# Patient Record
Sex: FEMALE | HISPANIC OR LATINO | Employment: FULL TIME | URBAN - METROPOLITAN AREA
[De-identification: names, ages, dates, MRNs, and addresses within clinical notes are randomized per-mention and may not be internally consistent; named-entity substitution may affect disease eponyms.]

---

## 2017-08-25 ENCOUNTER — OFFICE VISIT - HEALTHEAST (OUTPATIENT)
Dept: FAMILY MEDICINE | Facility: CLINIC | Age: 57
End: 2017-08-25

## 2017-08-25 DIAGNOSIS — Z00.00 HEALTHCARE MAINTENANCE: ICD-10-CM

## 2017-08-25 DIAGNOSIS — F41.9 ANXIETY: ICD-10-CM

## 2017-08-25 DIAGNOSIS — L30.9 DERMATITIS: ICD-10-CM

## 2017-08-25 LAB
CHOLEST SERPL-MCNC: 231 MG/DL
FASTING STATUS PATIENT QL REPORTED: YES
HBA1C MFR BLD: 6.3 % (ref 3.5–6)
HDLC SERPL-MCNC: 52 MG/DL
LDLC SERPL CALC-MCNC: 159 MG/DL
TRIGL SERPL-MCNC: 98 MG/DL

## 2017-08-25 ASSESSMENT — MIFFLIN-ST. JEOR: SCORE: 1014.74

## 2017-09-01 ENCOUNTER — OFFICE VISIT - HEALTHEAST (OUTPATIENT)
Dept: FAMILY MEDICINE | Facility: CLINIC | Age: 57
End: 2017-09-01

## 2017-09-01 ENCOUNTER — COMMUNICATION - HEALTHEAST (OUTPATIENT)
Dept: TELEHEALTH | Facility: CLINIC | Age: 57
End: 2017-09-01

## 2017-09-01 DIAGNOSIS — E78.5 HYPERLIPIDEMIA: ICD-10-CM

## 2017-09-01 DIAGNOSIS — R21 RASH: ICD-10-CM

## 2017-09-01 DIAGNOSIS — R73.03 PREDIABETES: ICD-10-CM

## 2017-09-01 ASSESSMENT — MIFFLIN-ST. JEOR: SCORE: 1009.07

## 2017-09-06 ENCOUNTER — AMBULATORY - HEALTHEAST (OUTPATIENT)
Dept: FAMILY MEDICINE | Facility: CLINIC | Age: 57
End: 2017-09-06

## 2017-09-06 DIAGNOSIS — E78.5 HYPERLIPIDEMIA: ICD-10-CM

## 2017-09-07 ENCOUNTER — COMMUNICATION - HEALTHEAST (OUTPATIENT)
Dept: FAMILY MEDICINE | Facility: CLINIC | Age: 57
End: 2017-09-07

## 2017-09-07 ENCOUNTER — HOSPITAL ENCOUNTER (OUTPATIENT)
Dept: MAMMOGRAPHY | Facility: HOSPITAL | Age: 57
Discharge: HOME | End: 2017-09-07
Attending: FAMILY MEDICINE

## 2017-09-07 DIAGNOSIS — Z00.00 HEALTHCARE MAINTENANCE: ICD-10-CM

## 2018-10-05 ENCOUNTER — RECORDS - HEALTHEAST (OUTPATIENT)
Dept: ADMINISTRATIVE | Facility: OTHER | Age: 58
End: 2018-10-05

## 2018-10-05 LAB — PAP SMEAR - HIM PATIENT REPORTED: NORMAL

## 2019-04-05 ENCOUNTER — OFFICE VISIT - HEALTHEAST (OUTPATIENT)
Dept: FAMILY MEDICINE | Facility: CLINIC | Age: 59
End: 2019-04-05

## 2019-04-05 DIAGNOSIS — Z00.00 ROUTINE GENERAL MEDICAL EXAMINATION AT A HEALTH CARE FACILITY: ICD-10-CM

## 2019-04-05 DIAGNOSIS — E73.9 LACTOSE INTOLERANCE: ICD-10-CM

## 2019-04-05 DIAGNOSIS — L60.9 NAIL ABNORMALITY: ICD-10-CM

## 2019-04-05 DIAGNOSIS — M54.2 NECK PAIN: ICD-10-CM

## 2019-04-05 DIAGNOSIS — Z12.11 SCREEN FOR COLON CANCER: ICD-10-CM

## 2019-04-05 DIAGNOSIS — Z12.31 VISIT FOR SCREENING MAMMOGRAM: ICD-10-CM

## 2019-04-05 ASSESSMENT — MIFFLIN-ST. JEOR: SCORE: 1053.86

## 2019-04-18 ENCOUNTER — RECORDS - HEALTHEAST (OUTPATIENT)
Dept: HEALTH INFORMATION MANAGEMENT | Facility: CLINIC | Age: 59
End: 2019-04-18

## 2020-01-20 ENCOUNTER — COMMUNICATION - HEALTHEAST (OUTPATIENT)
Dept: FAMILY MEDICINE | Facility: CLINIC | Age: 60
End: 2020-01-20

## 2020-02-05 ENCOUNTER — COMMUNICATION - HEALTHEAST (OUTPATIENT)
Dept: FAMILY MEDICINE | Facility: CLINIC | Age: 60
End: 2020-02-05

## 2020-09-02 ENCOUNTER — OFFICE VISIT - HEALTHEAST (OUTPATIENT)
Dept: FAMILY MEDICINE | Facility: CLINIC | Age: 60
End: 2020-09-02

## 2020-09-02 DIAGNOSIS — L91.8 SKIN TAG: ICD-10-CM

## 2020-09-02 DIAGNOSIS — Z12.11 SCREEN FOR COLON CANCER: ICD-10-CM

## 2020-09-02 DIAGNOSIS — Z00.00 ROUTINE GENERAL MEDICAL EXAMINATION AT A HEALTH CARE FACILITY: ICD-10-CM

## 2020-09-02 DIAGNOSIS — Z12.31 VISIT FOR SCREENING MAMMOGRAM: ICD-10-CM

## 2020-09-02 LAB
ANION GAP SERPL CALCULATED.3IONS-SCNC: 14 MMOL/L (ref 5–18)
BUN SERPL-MCNC: 20 MG/DL (ref 8–22)
CALCIUM SERPL-MCNC: 10 MG/DL (ref 8.5–10.5)
CHLORIDE BLD-SCNC: 104 MMOL/L (ref 98–107)
CHOLEST SERPL-MCNC: 218 MG/DL
CO2 SERPL-SCNC: 23 MMOL/L (ref 22–31)
CREAT SERPL-MCNC: 0.79 MG/DL (ref 0.6–1.1)
FASTING STATUS PATIENT QL REPORTED: YES
GFR SERPL CREATININE-BSD FRML MDRD: >60 ML/MIN/1.73M2
GLUCOSE BLD-MCNC: 104 MG/DL (ref 70–125)
HBA1C MFR BLD: 6.3 %
HDLC SERPL-MCNC: 52 MG/DL
HGB BLD-MCNC: 13.3 G/DL (ref 12–16)
LDLC SERPL CALC-MCNC: 144 MG/DL
POTASSIUM BLD-SCNC: 4.4 MMOL/L (ref 3.5–5)
SODIUM SERPL-SCNC: 141 MMOL/L (ref 136–145)
TRIGL SERPL-MCNC: 112 MG/DL

## 2020-09-02 ASSESSMENT — MIFFLIN-ST. JEOR: SCORE: 1062.37

## 2021-04-14 ENCOUNTER — AMBULATORY - HEALTHEAST (OUTPATIENT)
Dept: NURSING | Facility: CLINIC | Age: 61
End: 2021-04-14

## 2021-05-05 ENCOUNTER — AMBULATORY - HEALTHEAST (OUTPATIENT)
Dept: NURSING | Facility: CLINIC | Age: 61
End: 2021-05-05

## 2021-05-27 NOTE — PATIENT INSTRUCTIONS - HE
Nail and skin issues - referral to dermatology    Scheduled colonoscopy (anytime) and mammogram (due in the fall)    Probiotic - can try Align probiotic (over the counter) for one month to see if it helps with the GI issues

## 2021-05-27 NOTE — PROGRESS NOTES
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  All questions answered.  PAP UTD - will scan in results  Breast self exam technique reviewed and patient encouraged to perform self-exam monthly.  Discussed healthy lifestyle modifications.  Mammogram ordered.  Fasting lab will be scanned  The following high BMI interventions were performed this visit: encouragement to exercise and weight monitoring    1. Routine general medical examination at a health care facility  We reviewed her cholesterol level which was done at an outside facility.  This was slightly elevated at 226 but ASCVD guidelines show a 10-year risk for vascular event at 2%.  This was discussed with the patient and we have decided not to initiate any medication at this point.  We will continue to monitor yearly.    2. Screen for colon cancer  Last colonoscopy 2009.  - Ambulatory referral for Colonoscopy    3. Visit for screening mammogram  She will be due for mammogram this fall.  Orders were placed.  - Mammo Screening Bilateral; Future    4. Nail abnormality  I recommended that she follow-up with dermatology to look at her nail.  This does not look overly concerning but with a new hyperpigmentation under her nail I believe likely a biopsy would be beneficial.  - Ambulatory referral to Dermatology    5. Neck pain  Physical therapy exercises were given today.  She will let me know if she would like to see physical therapy in a more formal setting.  We discussed lifestyle modification as well.    6.  Lactose intolerance  Encouraged her to use Lactaid if needed.  If avoiding dairy products recommended making sure she is getting adequate amount of calcium.  Recommended a probiotic to see if this helps as well.      Subjective:      Bonnie Castaneda is a 58 y.o. female who presents for an annual exam.  She is overall doing well.  She has several concerns that she would like to discuss today.    1.  She brings in lab tests that were done at outside  "facility.  Her total cholesterol is 226 with an LDL of 147.  HDL cholesterol is 56.  Blood sugar is 100 fasting.  Otherwise the remainder of the BMP and a CBC as well as a urinalysis appear normal and she had a Pap smear as well which was normal but I do not see any HPV testing on this.  An EKG was done which was normal as well and normal PFTs.    2. Screen for colon cancer  She is due for a colonoscopy and would like to get that order today.    3. Visit for screening mammogram  She is due for mammogram and would like to get that order today.    4. Nail abnormality  She states that she has a skin tag under her arm which she would like looked at.  She would also like me to look at her right toenail which has a dark spot under it which seems like it is growing.    5. Neck pain  She has been experiencing some right-sided neck pain.  She feels like this is worse with stress.  She does carry a bag on that side but she does not think that it is heavy.  She would like me to evaluate this today.  She worries about \"clogged arteries.\"    6.  Lactose issues  She states that when she eats a lot of cheese or drinks a lot of milk she will have some indigestion issues.  She feels as though they are getting worse.  She has not tried any lactate medication.      Healthy Habits:   Regular Exercise: Yes  Sunscreen Use: Yes  Healthy Diet: Yes  Dental Visits Regularly: Yes  Seat Belt: Yes  Sexually active: Yes  Self Breast Exam Monthly:Yes  Colonoscopy: Yes  Prevention of Osteoporosis: No  Last Dexa: N/A    Immunization History   Administered Date(s) Administered     MMR 2003     Td,adult,historic,unspecified 2003     Tdap 2014     Immunization status: up to date and documented.    Gynecologic History  No LMP recorded. Patient is postmenopausal.  Contraception: post menopausal status  Last Pap: 2018. Results were: normal  Last mammogram: 2017. Results were: normal      OB History    Para Term  AB Living "   1 1 1         SAB TAB Ectopic Multiple Live Births                  # Outcome Date GA Lbr Steven/2nd Weight Sex Delivery Anes PTL Lv   1 Term                   No current outpatient medications on file.     No current facility-administered medications for this visit.      Past Medical History:   Diagnosis Date     Diverticulitis      Past Surgical History:   Procedure Laterality Date     OR DILATION/CURETTAGE,DIAGNOSTIC      Description: Dilation And Curettage;  Recorded: 09/02/2009;  Comments: Times 2     Patient has no known allergies.  Family History   Problem Relation Age of Onset     Heart disease Father      Heart disease Brother      Social History     Socioeconomic History     Marital status:      Spouse name: Not on file     Number of children: Not on file     Years of education: Not on file     Highest education level: Not on file   Occupational History     Not on file   Social Needs     Financial resource strain: Not on file     Food insecurity:     Worry: Not on file     Inability: Not on file     Transportation needs:     Medical: Not on file     Non-medical: Not on file   Tobacco Use     Smoking status: Never Smoker     Smokeless tobacco: Never Used   Substance and Sexual Activity     Alcohol use: No     Drug use: Not on file     Sexual activity: Yes     Birth control/protection: Post-menopausal   Lifestyle     Physical activity:     Days per week: Not on file     Minutes per session: Not on file     Stress: Not on file   Relationships     Social connections:     Talks on phone: Not on file     Gets together: Not on file     Attends Buddhist service: Not on file     Active member of club or organization: Not on file     Attends meetings of clubs or organizations: Not on file     Relationship status: Not on file     Intimate partner violence:     Fear of current or ex partner: Not on file     Emotionally abused: Not on file     Physically abused: Not on file     Forced sexual activity: Not on  "file   Other Topics Concern     Not on file   Social History Narrative     Not on file       Review of Systems  General:  Denies problems  Eyes:  Denies problems  Ears/Nose/Throat:  Denies problems  Cardiovascular:  Denies problems  Respiratory:  Denies problems  Gastrointestinal:  Denies problems  Genitourinary:  Denies problems  Musculoskeletal:  Denies problems  Skin:  Denies problems, Neurologic:  Denies problems  Psychiatric:  Denies problems  Endocrine:  Denies problems  Heme/Lymphatic:  Denies problems  Allergic/Immunologic:  Denies problems       Objective:         Vitals:    04/05/19 0827 04/05/19 0853   BP: 134/90 122/82   Pulse: 64    Resp: 12    Temp: 97.4  F (36.3  C)    TempSrc: Oral    Weight: 126 lb 6 oz (57.3 kg)    Height: 4' 11\" (1.499 m)        Physical Exam:  General Appearance: Alert, cooperative, no distress, appears stated age   Head: Normocephalic, without obvious abnormality, atraumatic  Eyes: PERRL, conjunctiva/corneas clear, EOM's intact   Ears: Normal TM's and external ear canals, both ears  Nose:Nares normal, septum midline,mucosa normal, no drainage    Throat:Lips, mucosa, and tongue normal; teeth and gums normal  Neck: Supple, symmetrical, trachea midline, no adenopathy;  thyroid: not enlarged, symmetric, no tenderness/mass/nodules  Back: Symmetric, no curvature, ROM normal,  Lungs: Clear to auscultation bilaterally, respirations unlabored  Breasts: No breast masses, tenderness, asymmetry, or nipple discharge.  Heart: Regular rate and rhythm, S1 and S2 normal, no murmur, rub, or gallop  Abdomen: Soft, non-tender, bowel sounds active all four quadrants,  no masses, no organomegaly  Extremities: Extremities normal, atraumatic, no cyanosis or edema  Skin: Skin color, texture, turgor normal, no rashes or lesions, the patient has several benign skin tags around her neck and work on her arm.  There is a mole that appears to be under her right great toenail which has some very slight " streaking.  Lymph nodes: Cervical, supraclavicular, and axillary nodes normal and   Neurologic: Normal   Musculoskeletal: Tenderness to palpation in right trapezius muscle.  Normal range of motion of head.

## 2021-05-30 ENCOUNTER — HEALTH MAINTENANCE LETTER (OUTPATIENT)
Age: 61
End: 2021-05-30

## 2021-05-31 ENCOUNTER — RECORDS - HEALTHEAST (OUTPATIENT)
Dept: ADMINISTRATIVE | Facility: CLINIC | Age: 61
End: 2021-05-31

## 2021-05-31 VITALS — HEIGHT: 58 IN | WEIGHT: 121.25 LBS | BODY MASS INDEX: 25.45 KG/M2

## 2021-05-31 VITALS — HEIGHT: 58 IN | BODY MASS INDEX: 25.19 KG/M2 | WEIGHT: 120 LBS

## 2021-06-01 ENCOUNTER — RECORDS - HEALTHEAST (OUTPATIENT)
Dept: ADMINISTRATIVE | Facility: CLINIC | Age: 61
End: 2021-06-01

## 2021-06-02 VITALS — BODY MASS INDEX: 25.48 KG/M2 | HEIGHT: 59 IN | WEIGHT: 126.38 LBS

## 2021-06-04 VITALS
WEIGHT: 128.25 LBS | BODY MASS INDEX: 25.85 KG/M2 | RESPIRATION RATE: 12 BRPM | TEMPERATURE: 96.6 F | DIASTOLIC BLOOD PRESSURE: 78 MMHG | SYSTOLIC BLOOD PRESSURE: 149 MMHG | HEART RATE: 59 BPM | HEIGHT: 59 IN

## 2021-06-11 NOTE — PROGRESS NOTES
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  All questions answered.  PAP UTD - she gets this done through her work physicals  Breast self exam technique reviewed and patient encouraged to perform self-exam monthly.  Discussed healthy lifestyle modifications.  Mammogram ordered.  Await fasting lab results  The following high BMI interventions were performed this visit: encouragement to exercise    1. Routine general medical examination at a health care facility  Labs below will be ordered.  She is very concerned regarding her family history of heart disease.  She will await the lab results but we did discuss a CT coronary calcium score.  Blood pressures were somewhat elevated today.  Lower on a manual.  - Lipid Cascade FASTING  - Glycosylated Hemoglobin A1c  - Basic Metabolic Panel  - Hemoglobin    2. Visit for screening mammogram  - Mammo Screening Bilateral; Future    3. Screen for colon cancer  She generally does a Cologuard once every 3 years.  She does believe that she is due but it gets this through her work physical.  She will let us know when she gets those results so we can update her chart.    4. Skin tag  After verbal consent was obtained 3 skin tags in her right armpit, 2 skin tags in her left armpit and 2 skin tags on her posterior neck were cleansed with an alcohol swab and numbed with 0.1 cc of lidocaine with epinephrine each.  Using a sterile iris sister these were snipped.  She tolerated the procedure well.  Minimal blood loss.          Subjective:      Bonnie Castaneda is a 60 y.o. female who presents for an annual exam.  She is overall feeling a bit stressed out.  Unfortunately her brother recently had a heart attack.  He has a history of heart disease and stents.    She is concerned about her own heart health.  She tries to eat healthfully and get some physical activity.    Cholesterol levels have been slightly elevated in the past but not to the point where we would need to do  medication.  Blood pressures have been normal in the past although slightly elevated today.    Healthy Habits:   Regular Exercise: Yes  Sunscreen Use: Yes  Healthy Diet: Yes  Dental Visits Regularly: Yes  Seat Belt: Yes  Sexually active: Yes  Self Breast Exam Monthly:Yes  Colonoscopy: No and cologuard every three years  Prevention of Osteoporosis: Yes  Last Dexa: N/A    Immunization History   Administered Date(s) Administered     INFLUENZA,RECOMBINANT,INJ,PF QUADRIVALENT 18+YRS 2020     INFLUENZA,SEASONAL QUAD, PF, =/> 6months 10/05/2017     Influenza, Seasonal, Inj PF IIV3 10/28/2009     Influenza,seasonal, Inj IIV3 10/28/2010, 10/27/2011, 2012, 10/09/2013, 10/16/2014     Influenza,seasonal,quad inj =/> 6months 10/15/2015     MMR 2003     Td,adult,historic,unspecified 2003     Tdap 2014     Immunization status: up to date and documented.    Gynecologic History  No LMP recorded. Patient is postmenopausal.  Contraception: post menopausal status  Last Pap: . Results were: normal  Last mammogram: . Results were: normal      OB History    Para Term  AB Living   1 1 1         SAB TAB Ectopic Multiple Live Births                  # Outcome Date GA Lbr Steven/2nd Weight Sex Delivery Anes PTL Lv   1 Term                No current outpatient medications on file.     No current facility-administered medications for this visit.      Past Medical History:   Diagnosis Date     Diverticulitis      Past Surgical History:   Procedure Laterality Date     DC DILATION/CURETTAGE,DIAGNOSTIC      Description: Dilation And Curettage;  Recorded: 2009;  Comments: Times 2     Patient has no known allergies.  Family History   Problem Relation Age of Onset     Heart disease Father      Heart disease Brother      Social History     Socioeconomic History     Marital status:      Spouse name: Not on file     Number of children: Not on file     Years of education: Not on file      "Highest education level: Not on file   Occupational History     Not on file   Social Needs     Financial resource strain: Not on file     Food insecurity     Worry: Not on file     Inability: Not on file     Transportation needs     Medical: Not on file     Non-medical: Not on file   Tobacco Use     Smoking status: Never Smoker     Smokeless tobacco: Never Used   Substance and Sexual Activity     Alcohol use: No     Drug use: Not on file     Sexual activity: Yes     Birth control/protection: Post-menopausal   Lifestyle     Physical activity     Days per week: Not on file     Minutes per session: Not on file     Stress: Not on file   Relationships     Social connections     Talks on phone: Not on file     Gets together: Not on file     Attends Synagogue service: Not on file     Active member of club or organization: Not on file     Attends meetings of clubs or organizations: Not on file     Relationship status: Not on file     Intimate partner violence     Fear of current or ex partner: Not on file     Emotionally abused: Not on file     Physically abused: Not on file     Forced sexual activity: Not on file   Other Topics Concern     Not on file   Social History Narrative     Not on file       Review of Systems  12 point review of systems was completed and found to be negative except for what is been stated above.      Objective:         Vitals:    09/02/20 0801 09/02/20 0825 09/02/20 0856   BP: 158/90 124/84 149/78   Pulse: (!) 59     Resp: 12     Temp: 96.6  F (35.9  C)     TempSrc: Oral     Weight: 128 lb 4 oz (58.2 kg)     Height: 4' 11\" (1.499 m)         Physical Exam:  General Appearance: Alert, cooperative, no distress, appears stated age   Head: Normocephalic, without obvious abnormality, atraumatic  Eyes: PERRL, conjunctiva/corneas clear, EOM's intact   Ears: Normal TM's and external ear canals, both ears  Nose:Nares normal, septum midline,mucosa normal, no drainage    Throat:Lips, mucosa, and tongue normal; " teeth and gums normal  Neck: Supple, symmetrical, trachea midline, no adenopathy;  thyroid: not enlarged, symmetric, no tenderness/mass/nodules  Back: Symmetric, no curvature, ROM normal,  Lungs: Clear to auscultation bilaterally, respirations unlabored  Breasts: No breast masses, tenderness, asymmetry, or nipple discharge.  Heart: Regular rate and rhythm, S1 and S2 normal, no murmur, rub, or gallop  Abdomen: Soft, non-tender, bowel sounds active all four quadrants,  no masses, no organomegaly  Extremities: Extremities normal, atraumatic, no cyanosis or edema  Skin: Skin color, texture, turgor normal, no rashes or lesions, skin tags in armpits and on neck  Lymph nodes: Cervical, supraclavicular, and axillary nodes normal and   Neurologic: Normal

## 2021-06-12 NOTE — PROGRESS NOTES
Assessment/ Plan     1. Rash  This may be a contact dermatitis as her phone has contact in that area  There is some erythema concerning for a mild cellulitis  A yeast infection is less likely    Recommend cephalexin 500 mg p.o. 3 times daily ×10 days  Recommend that she use hydrocortisone 2.5% cream as prescribed by Dr. Barclay  She understands that she will not use the topical steroid longer than 10 consecutive days  She will try to minimize contact with her phone in that area  Advised follow-up if not improving.  Would consider a referral to dermatology as the next step    2. Hyperlipidemia    Reviewed her elevated cholesterol readings and discussed cardiac risk factors  Her father has had significant cardiac disease  Her high-sensitivity CRP was elevated and reviewed that this is an inflammatory marker that is nonspecific  Recommend that she work on her diet and exercise   A handout was given describing elevated cholesterol and treatment  Given her family history and elevated cholesterol she could be considered for referral to cardiology to discuss a CT coronary calcium test  Recommend that she follow up with Dr. Barclay for other treatment recommendation      3. Prediabetes    Her hemoglobin A1c is elevated 6.3%  Recommend limiting carbohydrates in her diet  Advised follow-up with Dr. Barclay    25 minutes were spent with the patient and greater than 50% of the time was spent in face to face counseling and coordination of care        Subjective:       Bonnie Castaneda is a 57 y.o. female, patient of Dr. Barclay,  who presents to the clinic in follow-up for a rash on her left lower abdomen.  She describes a burning and somewhat itchy rash in that area.  She was concerned about shingles though this does not have the appearance of shingles.  She has not had blisters or small red bumps in this area.  Essentially, she has had an area of redness that also has a bruised appearance.  The area of redness has been increasing.   "She does admit that she wears her cell phone in her left pocket in the upper part sticks up and pushes against that area.  She has never had an obvious reaction to jewelry or other items in the past.  She denies fever or chills.  Also, she had recent laboratory testing including an elevated hemoglobin A1c of 6.3%.  Her cholesterol numbers were elevated.  Her total cholesterol was 231 with an LDL of 159.  There is a family history of coronary disease including her father and brother.    The following portions of the patient's history were reviewed and updated as appropriate: allergies, current medications, past family history, past medical history, past social history, past surgical history and problem list.    Review of Systems   A 12 point comprehensive review of systems was negative except as noted.      Current Outpatient Prescriptions   Medication Sig Dispense Refill     cephalexin (KEFLEX) 500 MG capsule Take 1 capsule (500 mg total) by mouth 3 (three) times a day for 10 days. 30 capsule 0     No current facility-administered medications for this visit.        Objective:      /64  Pulse 68  Temp 97.6  F (36.4  C) (Oral)   Resp 16  Ht 4' 10\" (1.473 m)  Wt 120 lb (54.4 kg)  BMI 25.08 kg/m2      General appearance: alert, appears stated age and cooperative  Head: Normocephalic, without obvious abnormality, atraumatic  Eyes: conjunctivae/corneas clear. PERRL, EOM's intact.   Skin: Examination of the left lower abdomen reveals an oval-shaped rash that is erythematous and warm to the touch  There is some central bluish discoloration which may be consistent with bruising  No vesicles are evident  Lymph nodes: Cervical, supraclavicular, and axillary nodes normal.  Neurologic: Alert and oriented X 3. Normal coordination and gait         No results found for this or any previous visit (from the past 168 hour(s)).       This note has been dictated using voice recognition software. Any grammatical or context " distortions are unintentional and inherent to the software
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
## 2021-06-16 PROBLEM — E78.5 HYPERLIPIDEMIA: Status: ACTIVE | Noted: 2017-09-01

## 2021-06-16 PROBLEM — R73.03 PREDIABETES: Status: ACTIVE | Noted: 2017-09-01

## 2021-06-20 NOTE — LETTER
Letter by Llay Barclay MD at      Author: Laly Barclay MD Service: -- Author Type: --    Filed:  Encounter Date: 1/20/2020 Status: Signed       Bonnie Castaneda  [address] [id number]    January 20, 2020    Dear Bonnie    In reviewing your records, we have determined a gap in your preventive services. Based on your age and health history, we recommend the follow service.     ? General Physical  ? Physical with a Pap Smear  ? Colon cancer screening  ? Mammogram  ? Immunization  ? Diabetic check  ? Blood pressure/cardiovascular check  ? Asthma check  ? Cholesterol test  ? Lab work  ? Med check    If you have had the service elsewhere, please contact us so we can update our records. Please let us know if you have transferred your care to another clinic.    Please call [phone] to schedule this appointment.    We believe that a strong preventive care program, including regular physicals and follow-up care is an important part of a healthy lifestyle and we are committed to helping you maintain your health.    Thank you for choosing us as your health care provider.    Sincerely,   [facility]  [id number] [place] [id number]  Phone Number:  [phone]

## 2021-06-20 NOTE — LETTER
Letter by Laly Barclay MD at      Author: Laly Barclay MD Service: -- Author Type: --    Filed:  Encounter Date: 2/5/2020 Status: (Other)       Bonnie Castaneda  [address] [id number]    February 5, 2020    Dear Bonnie    In reviewing your records, we have determined a gap in your preventive services. Based on your age and health history, we recommend the follow service.     ? General Physical  ? Physical with a Pap Smear  ? Colon cancer screening  ? Mammogram  ? Immunization  ? Diabetic check  ? Blood pressure/cardiovascular check  ? Asthma check  ? Cholesterol test  ? Lab work  ? Med check    If you have had the service elsewhere, please contact us so we can update our records. Please let us know if you have transferred your care to another clinic.    Please call [phone] to schedule this appointment.    We believe that a strong preventive care program, including regular physicals and follow-up care is an important part of a healthy lifestyle and we are committed to helping you maintain your health.    Thank you for choosing us as your health care provider.    Sincerely,   [facility]  [address] [id number]  Phone Number:  [phone]

## 2021-08-10 ENCOUNTER — OFFICE VISIT (OUTPATIENT)
Dept: FAMILY MEDICINE | Facility: CLINIC | Age: 61
End: 2021-08-10
Payer: COMMERCIAL

## 2021-08-10 VITALS
DIASTOLIC BLOOD PRESSURE: 67 MMHG | SYSTOLIC BLOOD PRESSURE: 133 MMHG | TEMPERATURE: 97.2 F | BODY MASS INDEX: 26.41 KG/M2 | HEIGHT: 59 IN | WEIGHT: 131 LBS | HEART RATE: 65 BPM

## 2021-08-10 DIAGNOSIS — R21 RASH AND NONSPECIFIC SKIN ERUPTION: Primary | ICD-10-CM

## 2021-08-10 DIAGNOSIS — Z12.11 SCREEN FOR COLON CANCER: ICD-10-CM

## 2021-08-10 DIAGNOSIS — Z12.31 VISIT FOR SCREENING MAMMOGRAM: ICD-10-CM

## 2021-08-10 PROCEDURE — 99213 OFFICE O/P EST LOW 20 MIN: CPT | Performed by: FAMILY MEDICINE

## 2021-08-10 RX ORDER — KETOCONAZOLE 20 MG/G
CREAM TOPICAL DAILY
Qty: 30 G | Refills: 1 | Status: CANCELLED | OUTPATIENT
Start: 2021-08-10

## 2021-08-10 RX ORDER — HYDROCORTISONE 25 MG/G
CREAM TOPICAL
Qty: 28 G | Refills: 1 | Status: SHIPPED | OUTPATIENT
Start: 2021-08-10 | End: 2021-10-25

## 2021-08-10 ASSESSMENT — MIFFLIN-ST. JEOR: SCORE: 1074.84

## 2021-08-10 NOTE — PATIENT INSTRUCTIONS
Oral zyrtec (cetirizine) pill 10 mg daily over the counter    Hydrocortisone cream sent to pharmacy    Avoid current bra and protein solution

## 2021-08-10 NOTE — PROGRESS NOTES
"    Assessment & Plan     Rash and nonspecific skin eruption  Pruritic rash on lateral aspects of bilateral breasts.  Does not appear fungal in nature, seems more consistent with a histamine reaction so recommended daily Zyrtec and topical hydrocortisone for now.  She also has a fairly new bra and has been using a protein detergent so will avoid these in case they are contributing to her rash.  - hydrocortisone, Perianal, (HYDROCORTISONE) 2.5 % cream; Apply to rash twice daily and then as needed. Do not use longer than one week.    Visit for screening mammogram  - *MA Screening Digital Bilateral; Future    Screen for colon cancer  - Adult Gastro Ref - Procedure Only; Future    Fadumo Salazar DO  [facility]    Raul Nicole is a 61 year old who presents for the following health issues  Chief Complaint   Patient presents with     Rash     around bra line, x 3 weeks, itchy     HPI     3-week history of bilateral breast rash.  It is primarily on the outer aspects of the bilateral breasts.  Some is underneath the bra line.  It is pruritic in nature and does not hurt.  Many years ago she had a painful right breast that was treated with antibiotics but this appears different.  She has a fairly new bra and has been using a protein detergent.  No one else at home has a rash.  She has no rash present anywhere else including her hands and feet.  She has not used anything for her current symptoms.        Objective    /67   Pulse 65   Temp 97.2  F (36.2  C) (Oral)   Ht 1.499 m (4' 11\")   Wt 59.4 kg (131 lb)   BMI 26.46 kg/m    Body mass index is 26.46 kg/m .  Physical Exam   GENERAL: healthy, alert and no distress  BREAST: normal without masses, tenderness or nipple discharge  SKIN: Slightly erythematous blotchy rash lateral aspects of bilateral breasts  PSYCH: mentation appears normal, affect normal/bright        "

## 2021-08-13 ENCOUNTER — MYC MEDICAL ADVICE (OUTPATIENT)
Dept: FAMILY MEDICINE | Facility: CLINIC | Age: 61
End: 2021-08-13

## 2021-08-13 DIAGNOSIS — R21 RASH AND NONSPECIFIC SKIN ERUPTION: Primary | ICD-10-CM

## 2021-08-16 RX ORDER — PREDNISONE 20 MG/1
20 TABLET ORAL EVERY MORNING
Qty: 5 TABLET | Refills: 0 | Status: SHIPPED | OUTPATIENT
Start: 2021-08-16 | End: 2021-08-21

## 2021-09-19 ENCOUNTER — HEALTH MAINTENANCE LETTER (OUTPATIENT)
Age: 61
End: 2021-09-19

## 2021-10-25 ENCOUNTER — OFFICE VISIT (OUTPATIENT)
Dept: FAMILY MEDICINE | Facility: CLINIC | Age: 61
End: 2021-10-25
Payer: COMMERCIAL

## 2021-10-25 VITALS
SYSTOLIC BLOOD PRESSURE: 136 MMHG | RESPIRATION RATE: 12 BRPM | BODY MASS INDEX: 26.41 KG/M2 | TEMPERATURE: 96.8 F | HEIGHT: 59 IN | DIASTOLIC BLOOD PRESSURE: 84 MMHG | HEART RATE: 68 BPM | WEIGHT: 131 LBS

## 2021-10-25 DIAGNOSIS — Z23 NEED FOR PROPHYLACTIC VACCINATION AND INOCULATION AGAINST INFLUENZA: ICD-10-CM

## 2021-10-25 DIAGNOSIS — Z00.00 HEALTHCARE MAINTENANCE: Primary | ICD-10-CM

## 2021-10-25 DIAGNOSIS — Z82.49 FAMILY HISTORY OF ISCHEMIC HEART DISEASE: ICD-10-CM

## 2021-10-25 DIAGNOSIS — Z12.31 VISIT FOR SCREENING MAMMOGRAM: ICD-10-CM

## 2021-10-25 LAB
ALBUMIN SERPL-MCNC: 3.6 G/DL (ref 3.4–5)
ALP SERPL-CCNC: 67 U/L (ref 40–150)
ALT SERPL W P-5'-P-CCNC: 20 U/L (ref 0–50)
ANION GAP SERPL CALCULATED.3IONS-SCNC: 4 MMOL/L (ref 3–14)
AST SERPL W P-5'-P-CCNC: 16 U/L (ref 0–45)
BILIRUB SERPL-MCNC: 0.4 MG/DL (ref 0.2–1.3)
BUN SERPL-MCNC: 14 MG/DL (ref 7–30)
CALCIUM SERPL-MCNC: 9.3 MG/DL (ref 8.5–10.1)
CHLORIDE BLD-SCNC: 106 MMOL/L (ref 94–109)
CHOLEST SERPL-MCNC: 211 MG/DL
CO2 SERPL-SCNC: 29 MMOL/L (ref 20–32)
CREAT SERPL-MCNC: 0.75 MG/DL (ref 0.52–1.04)
FASTING STATUS PATIENT QL REPORTED: YES
GFR SERPL CREATININE-BSD FRML MDRD: 88 ML/MIN/1.73M2
GLUCOSE BLD-MCNC: 96 MG/DL (ref 70–99)
HBA1C MFR BLD: 6.2 % (ref 0–5.6)
HCV AB SERPL QL IA: NONREACTIVE
HDLC SERPL-MCNC: 60 MG/DL
HGB BLD-MCNC: 13.5 G/DL (ref 11.7–15.7)
HIV 1+2 AB+HIV1 P24 AG SERPL QL IA: NONREACTIVE
LDLC SERPL CALC-MCNC: 128 MG/DL
NONHDLC SERPL-MCNC: 151 MG/DL
POTASSIUM BLD-SCNC: 4 MMOL/L (ref 3.4–5.3)
PROT SERPL-MCNC: 7.9 G/DL (ref 6.8–8.8)
SODIUM SERPL-SCNC: 139 MMOL/L (ref 133–144)
TRIGL SERPL-MCNC: 115 MG/DL

## 2021-10-25 PROCEDURE — 83036 HEMOGLOBIN GLYCOSYLATED A1C: CPT | Performed by: FAMILY MEDICINE

## 2021-10-25 PROCEDURE — 36415 COLL VENOUS BLD VENIPUNCTURE: CPT | Performed by: FAMILY MEDICINE

## 2021-10-25 PROCEDURE — 80061 LIPID PANEL: CPT | Performed by: FAMILY MEDICINE

## 2021-10-25 PROCEDURE — 90471 IMMUNIZATION ADMIN: CPT | Performed by: FAMILY MEDICINE

## 2021-10-25 PROCEDURE — 99396 PREV VISIT EST AGE 40-64: CPT | Mod: 25 | Performed by: FAMILY MEDICINE

## 2021-10-25 PROCEDURE — 90682 RIV4 VACC RECOMBINANT DNA IM: CPT | Performed by: FAMILY MEDICINE

## 2021-10-25 PROCEDURE — 87389 HIV-1 AG W/HIV-1&-2 AB AG IA: CPT | Performed by: FAMILY MEDICINE

## 2021-10-25 PROCEDURE — 86803 HEPATITIS C AB TEST: CPT | Performed by: FAMILY MEDICINE

## 2021-10-25 PROCEDURE — 85018 HEMOGLOBIN: CPT | Performed by: FAMILY MEDICINE

## 2021-10-25 PROCEDURE — 80053 COMPREHEN METABOLIC PANEL: CPT | Performed by: FAMILY MEDICINE

## 2021-10-25 ASSESSMENT — ENCOUNTER SYMPTOMS
CONSTIPATION: 0
SHORTNESS OF BREATH: 0
FEVER: 0
PARESTHESIAS: 0
HEARTBURN: 0
FREQUENCY: 0
ARTHRALGIAS: 0
DYSURIA: 0
NAUSEA: 0
DIZZINESS: 0
SORE THROAT: 0
CHILLS: 0
DIARRHEA: 0
PALPITATIONS: 0
NERVOUS/ANXIOUS: 0
MYALGIAS: 0
HEADACHES: 0
COUGH: 0
HEMATOCHEZIA: 0
JOINT SWELLING: 0
BREAST MASS: 0
HEMATURIA: 0
WEAKNESS: 0
ABDOMINAL PAIN: 0
EYE PAIN: 0

## 2021-10-25 ASSESSMENT — MIFFLIN-ST. JEOR: SCORE: 1069.45

## 2021-10-25 NOTE — PROGRESS NOTES
"Answers for HPI/ROS submitted by the patient on 10/25/2021  Frequency of exercise:: 6-7 days/week  Getting at least 3 servings of Calcium per day:: Yes  Diet:: Regular (no restrictions)  Taking medications regularly:: Yes  Medication side effects:: None  Bi-annual eye exam:: Yes  Dental care twice a year:: Yes  Sleep apnea or symptoms of sleep apnea:: None  abdominal pain: No  Blood in stool: No  Blood in urine: No  chest pain: No  chills: No  congestion: No  constipation: No  cough: No  diarrhea: No  dizziness: No  ear pain: No  eye pain: No  nervous/anxious: No  fever: No  frequency: No  genital sores: No  headaches: No  hearing loss: No  heartburn: No  arthralgias: No  joint swelling: No  peripheral edema: No  mood changes: No  myalgias: No  nausea: No  dysuria: No  palpitations: No  Skin sensation changes: No  sore throat: No  urgency: No  rash: No  shortness of breath: No  visual disturbance: No  weakness: No  pelvic pain: No  vaginal bleeding: No  vaginal discharge: No  tenderness: No  breast mass: No  breast discharge: No  Additional concerns today:: No  Duration of exercise:: 15-30 minutes        Assessment/Plan:     Health maintenance female exam.  All questions answered.  Breast self exam technique reviewed and patient encouraged to perform self-exam monthly.  Discussed healthy lifestyle modifications.  Mammogram ordered.  She states that she is up to date on colon cancer screening.  She had a Cologuard test last year.  Await fasting lab results    BMI:   Estimated body mass index is 26.77 kg/m  as calculated from the following:    Height as of this encounter: 1.49 m (4' 10.66\").    Weight as of this encounter: 59.4 kg (131 lb).   Weight management plan: Discussed healthy diet and exercise guidelines      Healthcare maintenance  - Hepatitis C Screen Reflex to HCV RNA Quant and Genotype  - HIV Antigen Antibody Combo  - Lipid panel reflex to direct LDL Fasting  - Hemoglobin A1c  - Hemoglobin  - Comprehensive " metabolic panel (BMP + Alb, Alk Phos, ALT, AST, Total. Bili, TP)    Visit for screening mammogram  - *MA Screening Digital Bilateral    Family history of ischemic heart disease  Due to family history we will have her speak with cardiology regarding coronary calcium CT scan versus a stress test.  Patient is asymptomatic.  Discussed that she may benefit from a statin medication she would like to discuss this with cardiology as well.  - Adult Cardiology Eval Referral    Need for prophylactic vaccination and inoculation against influenza  - INFLUENZA QUAD, RECOMBINANT, P-FREE (RIV4) (FLUBLOK)        Patient has been advised of split billing requirements and indicates understanding: Yes      Subjective:     Bonnie Castaneda is a 61 year old female who presents for an annual exam.  She unfortunately has been feeling fairly stressed recently.  One of her brothers is currently in the hospital with some heart issues and lung issues.  She has a strong family history of coronary artery disease in her brothers.    Her mother is 84 and living with her which has been somewhat difficult as well.    She unfortunately went to the wrong clinic today and is has added to her stress a significant amount.    Healthy Habits:   Regular Exercise: Yes  Sunscreen Use: Yes  Healthy Diet: yes  Dental Visits Regularly: yes  Seat Belt: Yes  Self Breast Exam Monthly: yes  Colonoscopy: Patient states she had a Cologuard test last year through her 's work.  She will get me those records.  Prevention of Osteoporosis: yes    Immunization History   Administered Date(s) Administered     COVID-19,PF,Pfizer 04/14/2021, 05/05/2021     FLU 6-35 months 10/28/2009     Influenza (IIV3) PF 10/28/2010, 10/27/2011, 09/27/2012, 10/09/2013, 10/16/2014     Influenza Quad, Recombinant, pf(RIV4) (Flublok) 09/02/2020, 10/25/2021     Influenza Vaccine IM > 6 months Valent IIV4 (Alfuria,Fluzone) 10/05/2017     Influenza Vaccine, 6+MO IM (QUADRIVALENT  W/PRESERVATIVES) 10/15/2015     MMR 2003     Tdap (Adacel,Boostrix) 2014     Tdap (Adult) Unspecified Formulation 2003         Gynecologic History  No LMP recorded. Patient is postmenopausal.  Contraception: post menopausal status  Last Pap: 2018 Results were: normal  Last mammogram: . Results were: normal      OB History    Para Term  AB Living   1 1 1 0 0 0   SAB TAB Ectopic Multiple Live Births   0 0 0 0 0      # Outcome Date GA Lbr Steven/2nd Weight Sex Delivery Anes PTL Lv   1 Term                No current outpatient medications on file.     Past Medical History:   Diagnosis Date     Diverticulitis      Past Surgical History:   Procedure Laterality Date     HC DILATION/CURETTAGE DIAG/THER NON OB      Description: Dilation And Curettage;  Recorded: 2009;  Comments: Times 2     Patient has no known allergies.  Family History   Problem Relation Age of Onset     Heart Disease Father      Heart Disease Brother      Social History     Socioeconomic History     Marital status:      Spouse name: Not on file     Number of children: Not on file     Years of education: Not on file     Highest education level: Not on file   Occupational History     Not on file   Tobacco Use     Smoking status: Never Smoker     Smokeless tobacco: Never Used   Substance and Sexual Activity     Alcohol use: No     Drug use: Not on file     Sexual activity: Yes     Birth control/protection: Post-menopausal   Other Topics Concern     Not on file   Social History Narrative     Not on file     Social Determinants of Health     Financial Resource Strain:      Difficulty of Paying Living Expenses:    Food Insecurity:      Worried About Running Out of Food in the Last Year:      Ran Out of Food in the Last Year:    Transportation Needs:      Lack of Transportation (Medical):      Lack of Transportation (Non-Medical):    Physical Activity:      Days of Exercise per Week:      Minutes of Exercise per  "Session:    Stress:      Feeling of Stress :    Social Connections:      Frequency of Communication with Friends and Family:      Frequency of Social Gatherings with Friends and Family:      Attends Congregational Services:      Active Member of Clubs or Organizations:      Attends Club or Organization Meetings:      Marital Status:    Intimate Partner Violence:      Fear of Current or Ex-Partner:      Emotionally Abused:      Physically Abused:      Sexually Abused:        Review of Systems  12 point review of systems was completed and found to be negative except for what is been stated above.      Objective:      Vitals:    10/25/21 1004 10/25/21 1041   BP: (!) 170/92 136/84   Pulse: 68    Resp: 12    Temp: 96.8  F (36  C)    TempSrc: Tympanic    Weight: 59.4 kg (131 lb)    Height: 1.49 m (4' 10.66\")          Physical Exam:  General Appearance: Alert, cooperative, no distress, appears stated age   Head: Normocephalic, without obvious abnormality, atraumatic  Eyes: PERRL, conjunctiva/corneas clear, EOM's intact   Ears: Normal TM's and external ear canals, both ears  Neck: Supple, symmetrical, trachea midline, no adenopathy;  thyroid: not enlarged, symmetric, no tenderness/mass/nodules  Back: Symmetric, no curvature, ROM normal,  Lungs: Clear to auscultation bilaterally, respirations unlabored  Breasts: No breast masses, tenderness, asymmetry, or nipple discharge.  Heart: Regular rate and rhythm, S1 and S2 normal, no murmur, rub, or gallop  Abdomen: Soft, non-tender, bowel sounds active all four quadrants,  no masses, no organomegaly  Extremities: Extremities normal, atraumatic, no cyanosis or edema  Skin: Skin color, texture, turgor normal, no rashes or lesions  Lymph nodes: Cervical, supraclavicular, and axillary nodes normal and   Neurologic: Normal       "

## 2022-02-17 ENCOUNTER — TELEPHONE (OUTPATIENT)
Dept: FAMILY MEDICINE | Facility: CLINIC | Age: 62
End: 2022-02-17

## 2022-02-17 ENCOUNTER — OFFICE VISIT (OUTPATIENT)
Dept: FAMILY MEDICINE | Facility: CLINIC | Age: 62
End: 2022-02-17
Payer: COMMERCIAL

## 2022-02-17 VITALS
WEIGHT: 130 LBS | TEMPERATURE: 97.6 F | HEIGHT: 59 IN | OXYGEN SATURATION: 99 % | DIASTOLIC BLOOD PRESSURE: 86 MMHG | SYSTOLIC BLOOD PRESSURE: 142 MMHG | BODY MASS INDEX: 26.21 KG/M2 | HEART RATE: 61 BPM

## 2022-02-17 DIAGNOSIS — R19.7 DIARRHEA, UNSPECIFIED TYPE: Primary | ICD-10-CM

## 2022-02-17 DIAGNOSIS — R10.13 ABDOMINAL PAIN, EPIGASTRIC: ICD-10-CM

## 2022-02-17 LAB
ALBUMIN SERPL-MCNC: 3.7 G/DL (ref 3.4–5)
ALP SERPL-CCNC: 72 U/L (ref 40–150)
ALT SERPL W P-5'-P-CCNC: 19 U/L (ref 0–50)
ANION GAP SERPL CALCULATED.3IONS-SCNC: 5 MMOL/L (ref 3–14)
AST SERPL W P-5'-P-CCNC: 10 U/L (ref 0–45)
BASOPHILS # BLD AUTO: 0 10E3/UL (ref 0–0.2)
BASOPHILS NFR BLD AUTO: 0 %
BILIRUB SERPL-MCNC: 0.3 MG/DL (ref 0.2–1.3)
BUN SERPL-MCNC: 13 MG/DL (ref 7–30)
C DIFF TOX B STL QL: POSITIVE
CALCIUM SERPL-MCNC: 9.3 MG/DL (ref 8.5–10.1)
CHLORIDE BLD-SCNC: 103 MMOL/L (ref 94–109)
CO2 SERPL-SCNC: 28 MMOL/L (ref 20–32)
CREAT SERPL-MCNC: 0.62 MG/DL (ref 0.52–1.04)
EOSINOPHIL # BLD AUTO: 0.2 10E3/UL (ref 0–0.7)
EOSINOPHIL NFR BLD AUTO: 2 %
ERYTHROCYTE [DISTWIDTH] IN BLOOD BY AUTOMATED COUNT: 13.2 % (ref 10–15)
GFR SERPL CREATININE-BSD FRML MDRD: >90 ML/MIN/1.73M2
GLUCOSE BLD-MCNC: 112 MG/DL (ref 70–99)
HCT VFR BLD AUTO: 40.7 % (ref 35–47)
HGB BLD-MCNC: 13.5 G/DL (ref 11.7–15.7)
LIPASE SERPL-CCNC: 112 U/L (ref 73–393)
LYMPHOCYTES # BLD AUTO: 1.6 10E3/UL (ref 0.8–5.3)
LYMPHOCYTES NFR BLD AUTO: 16 %
MCH RBC QN AUTO: 31.8 PG (ref 26.5–33)
MCHC RBC AUTO-ENTMCNC: 33.2 G/DL (ref 31.5–36.5)
MCV RBC AUTO: 96 FL (ref 78–100)
MONOCYTES # BLD AUTO: 0.6 10E3/UL (ref 0–1.3)
MONOCYTES NFR BLD AUTO: 6 %
NEUTROPHILS # BLD AUTO: 7.7 10E3/UL (ref 1.6–8.3)
NEUTROPHILS NFR BLD AUTO: 76 %
PLATELET # BLD AUTO: 294 10E3/UL (ref 150–450)
POTASSIUM BLD-SCNC: 3.6 MMOL/L (ref 3.4–5.3)
PROT SERPL-MCNC: 8.1 G/DL (ref 6.8–8.8)
RBC # BLD AUTO: 4.24 10E6/UL (ref 3.8–5.2)
SODIUM SERPL-SCNC: 136 MMOL/L (ref 133–144)
WBC # BLD AUTO: 10.2 10E3/UL (ref 4–11)

## 2022-02-17 PROCEDURE — 36415 COLL VENOUS BLD VENIPUNCTURE: CPT | Performed by: PHYSICIAN ASSISTANT

## 2022-02-17 PROCEDURE — 85025 COMPLETE CBC W/AUTO DIFF WBC: CPT | Performed by: PHYSICIAN ASSISTANT

## 2022-02-17 PROCEDURE — 83690 ASSAY OF LIPASE: CPT | Performed by: PHYSICIAN ASSISTANT

## 2022-02-17 PROCEDURE — 87209 SMEAR COMPLEX STAIN: CPT | Performed by: PHYSICIAN ASSISTANT

## 2022-02-17 PROCEDURE — 80053 COMPREHEN METABOLIC PANEL: CPT | Performed by: PHYSICIAN ASSISTANT

## 2022-02-17 PROCEDURE — 87493 C DIFF AMPLIFIED PROBE: CPT | Mod: 59 | Performed by: PHYSICIAN ASSISTANT

## 2022-02-17 PROCEDURE — 87506 IADNA-DNA/RNA PROBE TQ 6-11: CPT | Performed by: PHYSICIAN ASSISTANT

## 2022-02-17 PROCEDURE — 99213 OFFICE O/P EST LOW 20 MIN: CPT | Performed by: PHYSICIAN ASSISTANT

## 2022-02-17 PROCEDURE — 87177 OVA AND PARASITES SMEARS: CPT | Performed by: PHYSICIAN ASSISTANT

## 2022-02-17 ASSESSMENT — PAIN SCALES - GENERAL: PAINLEVEL: MODERATE PAIN (5)

## 2022-02-17 NOTE — PROGRESS NOTES
Assessment & Plan       ICD-10-CM    1. Diarrhea, unspecified type  R19.7 Enteric Bacteria and Virus Panel by SKIP Stool     Ova and Parasite Exam Routine     Clostridium difficile Toxin B PCR     Clostridium difficile Toxin B PCR     Ova and Parasite Exam Routine     Enteric Bacteria and Virus Panel by SKIP Stool   2. Abdominal pain, epigastric  R10.13 Enteric Bacteria and Virus Panel by SKIP Stool     Ova and Parasite Exam Routine     Clostridium difficile Toxin B PCR     Comprehensive metabolic panel (BMP + Alb, Alk Phos, ALT, AST, Total. Bili, TP)     Lipase     CBC with platelets and differential     Comprehensive metabolic panel (BMP + Alb, Alk Phos, ALT, AST, Total. Bili, TP)     Lipase     CBC with platelets and differential     Clostridium difficile Toxin B PCR     Ova and Parasite Exam Routine     Enteric Bacteria and Virus Panel by SKIP Stool     Possibly viral gastroenteritis vs other?   No red flags on exam. She is pushing on the epigastric area during the exam but when I pressed on that area during my exam there really was no increased pain or discomfort. She just notes that to the general area that is bothering her  Discussed getting some labs and stool samples today  I would expect if this was a viral GI bug that it should start to improve but will r/o stool issue and check liver and pancreas.   If work up negative and pain persists, consider imaging              Return in about 1 week (around 2/24/2022) for If not improving or worsening.    PATRICK Jack [facility] PIPO Nicole is a 61 year old who presents for the following health issues     HPI     Pain History:  When did you first notice your pain? - Less than 1 week   Have you seen anyone else for your pain? No  Where in your body do you have pain? Abdominal/Flank Pain  Onset/Duration: 6 days   Description:   Character: Sharp, Stabbing and Cramping  Location: epigastric region  Radiation: None  Intensity:  "moderate-severe  Progression of Symptoms:  same  Accompanying Signs & Symptoms:  Fever/Chills: YES- in the beginning she had chills   Gas/Bloating: YES- both   Nausea: no  Vomitting: no  Diarrhea: YES  Constipation: no  Dysuria or Hematuria: no  History:   Trauma: no  Previous similar pain: no  Previous tests done: none  Precipitating factors:   Does the pain change with:     Food: YES- cheese     Bowel Movement: YES- the pain will be relieved for a short amount of time after going     Urination: no   Other factors:  no  Therapies tried and outcome: Pepto bismol   No LMP recorded. Patient is postmenopausal.      Started about a week ago  Constant \"gnawing\" type pain across her abdomen  She has a h/o diverticulitis but feels this pain is different and points to upper abdomen versus lower abdomen  Accompanied by diarrhea - going frequently throughout the day. Sometimes there is some form, othertimes it is loose and \"all over\"   No blood in the stool  Cramping pain improves briefly after stooling but then returns  Not necessarily worsened by eating but knows if she does eat something it will be coming right back out  Keeping fluids in okay - drinking pedialyte     No fevers  Chills in the beginning -that seems to have resolved  No nausea or vomiting  Pain not increasing - just staying the same    On no medications  No antibiotic use in the last several months - was at the dentist recently but just for a cleaning  No change in diet or new foods that precipitated this  No burping, bloating or h/o reflux      Review of Systems   Remainder of ROS obtained and found to be negative other than that which was documented above        Objective    BP (!) 142/86   Pulse 61   Temp 97.6  F (36.4  C) (Tympanic)   Ht 1.488 m (4' 10.6\")   Wt 59 kg (130 lb)   SpO2 99%   BMI 26.62 kg/m    Body mass index is 26.62 kg/m .  Physical Exam   GENERAL: healthy, alert and no distress  CV: regular rates and rhythm, normal S1 S2, no S3 or " S4, no murmur, click or rub and no peripheral edema  ABDOMEN: bowel sounds normal, soft, no rebound or guarding. No specific or focal area of pain    Diagnostic Tests:   pending

## 2022-02-17 NOTE — TELEPHONE ENCOUNTER
Patient's call transferred to author    Patient reports she was evaluated today by Alicia and stool samples were ordered  Patient collected 2 out of the 3 bottles as she could not get the third bottle open    While on the phone, patient was able to open the third bottle and will collect stool after her next bowel movement     Reporting watery stools   Reports last 2 stools have had small bright red areas   Denies feeling dizzy / lightheaded - patient also had labs drawn today    Will forward to Alicia to review    Hadley Myers RN

## 2022-02-18 LAB
C COLI+JEJUNI+LARI FUSA STL QL NAA+PROBE: NOT DETECTED
EC STX1 GENE STL QL NAA+PROBE: NOT DETECTED
EC STX2 GENE STL QL NAA+PROBE: NOT DETECTED
NOROV GI+II ORF1-ORF2 JNC STL QL NAA+PR: NOT DETECTED
O+P STL MICRO: NEGATIVE
RVA NSP5 STL QL NAA+PROBE: NOT DETECTED
SALMONELLA SP RPOD STL QL NAA+PROBE: NOT DETECTED
SHIGELLA SP+EIEC IPAH STL QL NAA+PROBE: NOT DETECTED
TRI STN SPEC: NORMAL
V CHOL+PARA RFBL+TRKH+TNAA STL QL NAA+PR: NOT DETECTED
Y ENTERO RECN STL QL NAA+PROBE: NOT DETECTED

## 2022-02-19 ENCOUNTER — TELEPHONE (OUTPATIENT)
Dept: FAMILY MEDICINE | Facility: CLINIC | Age: 62
End: 2022-02-19
Payer: COMMERCIAL

## 2022-02-19 ENCOUNTER — MYC MEDICAL ADVICE (OUTPATIENT)
Dept: FAMILY MEDICINE | Facility: CLINIC | Age: 62
End: 2022-02-19
Payer: COMMERCIAL

## 2022-02-19 ENCOUNTER — NURSE TRIAGE (OUTPATIENT)
Dept: NURSING | Facility: CLINIC | Age: 62
End: 2022-02-19
Payer: COMMERCIAL

## 2022-02-19 DIAGNOSIS — R19.7 DIARRHEA: Primary | ICD-10-CM

## 2022-02-19 RX ORDER — VANCOMYCIN HYDROCHLORIDE 125 MG/1
125 CAPSULE ORAL 4 TIMES DAILY
Qty: 40 CAPSULE | Refills: 0 | Status: SHIPPED | OUTPATIENT
Start: 2022-02-19 | End: 2022-03-24

## 2022-02-19 NOTE — TELEPHONE ENCOUNTER
Patient is calling and states that she is still having diarrhea and stomach pain.  Patient is diagnosed with C Dif and no medication has been sent.  [facility] [address] in [place].  FNA paged on call Jay Jones to phone FNA back at 8:14 am.  FNA paged again at 8:34 am.  MD phone back and gave verbal for Vancomycin 125 mg PO Qid daily for 10 days quantity 40.  FNA sent prescription. FNA phoned patient back.      COVID 19 Nurse Triage Plan/Patient Instructions    Please be aware that novel coronavirus (COVID-19) may be circulating in the community. If you develop symptoms such as fever, cough, or SOB or if you have concerns about the presence of another infection including coronavirus (COVID-19), please contact your health care provider or visit [email or web address].     Disposition/Instructions    Home care recommended. Follow home care protocol based instructions.    Thank you for taking steps to prevent the spread of this virus.  o Limit your contact with others.  o Wear a simple mask to cover your cough.  o Wash your hands well and often.    Resources    [facility]: About COVID-19: [email or web address]    CDC: What to Do If You're Sick: www.cdc.gov/coronavirus/2019-ncov/about/steps-when-sick.html    CDC: Ending Home Isolation: www.cdc.gov/coronavirus/2019-ncov/hcp/disposition-in-home-patients.html     CDC: Caring for Someone: www.cdc.gov/coronavirus/2019-ncov/if-you-are-sick/care-for-someone.html     [facility]: Interim Guidance for Hospital Discharge to Home: www.health.[facility].[place]/diseases/coronavirus/hcp/hospdischarge.pdf    [facility] clinical trials (COVID-19 research studies): [email or web address].[facility]-clinical-trials     Below are the COVID-19 hotlines at the [place] Department of Health ([facility]). Interpreters are available.   o For health questions: Call [phone] or [phone] (7 a.m. to 7 p.m.)  o For questions about schools and childcare: Call  [phone] or [phone] (7 a.m. to 7 p.m.)

## 2022-02-19 NOTE — TELEPHONE ENCOUNTER
Patient is calling and states that she is still having diarrhea and stomach pain.  Patient is diagnosed with C Dif and no medication has been sent.  [facility] [address] in [place].  FNA paged on call Jay Jones to phone FNA back at 8:14 am.  FNA paged again at 8:34 am.  MD phone back and gave verbal for Vancomycin 125 mg PO Qid daily for 10 days quantity 40.  FNA sent prescription. FNA phoned patient back.    Agnes Cummings RN/FNA

## 2022-03-24 ENCOUNTER — OFFICE VISIT (OUTPATIENT)
Dept: FAMILY MEDICINE | Facility: CLINIC | Age: 62
End: 2022-03-24
Payer: COMMERCIAL

## 2022-03-24 VITALS
BODY MASS INDEX: 26.13 KG/M2 | HEART RATE: 70 BPM | WEIGHT: 127.6 LBS | DIASTOLIC BLOOD PRESSURE: 86 MMHG | SYSTOLIC BLOOD PRESSURE: 138 MMHG | OXYGEN SATURATION: 98 % | TEMPERATURE: 98.5 F

## 2022-03-24 DIAGNOSIS — R10.84 ABDOMINAL PAIN, GENERALIZED: Primary | ICD-10-CM

## 2022-03-24 LAB
ALBUMIN SERPL-MCNC: 3.9 G/DL (ref 3.4–5)
ALP SERPL-CCNC: 81 U/L (ref 40–150)
ALT SERPL W P-5'-P-CCNC: 27 U/L (ref 0–50)
ANION GAP SERPL CALCULATED.3IONS-SCNC: 7 MMOL/L (ref 3–14)
AST SERPL W P-5'-P-CCNC: 15 U/L (ref 0–45)
BASOPHILS # BLD AUTO: 0 10E3/UL (ref 0–0.2)
BASOPHILS NFR BLD AUTO: 0 %
BILIRUB SERPL-MCNC: 0.3 MG/DL (ref 0.2–1.3)
BUN SERPL-MCNC: 15 MG/DL (ref 7–30)
CALCIUM SERPL-MCNC: 9.4 MG/DL (ref 8.5–10.1)
CHLORIDE BLD-SCNC: 103 MMOL/L (ref 94–109)
CO2 SERPL-SCNC: 27 MMOL/L (ref 20–32)
CREAT SERPL-MCNC: 0.7 MG/DL (ref 0.52–1.04)
EOSINOPHIL # BLD AUTO: 0.6 10E3/UL (ref 0–0.7)
EOSINOPHIL NFR BLD AUTO: 6 %
ERYTHROCYTE [DISTWIDTH] IN BLOOD BY AUTOMATED COUNT: 13.9 % (ref 10–15)
GFR SERPL CREATININE-BSD FRML MDRD: >90 ML/MIN/1.73M2
GLUCOSE BLD-MCNC: 95 MG/DL (ref 70–99)
HCT VFR BLD AUTO: 41.5 % (ref 35–47)
HGB BLD-MCNC: 13.5 G/DL (ref 11.7–15.7)
LYMPHOCYTES # BLD AUTO: 1.8 10E3/UL (ref 0.8–5.3)
LYMPHOCYTES NFR BLD AUTO: 18 %
MCH RBC QN AUTO: 31.8 PG (ref 26.5–33)
MCHC RBC AUTO-ENTMCNC: 32.5 G/DL (ref 31.5–36.5)
MCV RBC AUTO: 98 FL (ref 78–100)
MONOCYTES # BLD AUTO: 0.9 10E3/UL (ref 0–1.3)
MONOCYTES NFR BLD AUTO: 8 %
NEUTROPHILS # BLD AUTO: 6.8 10E3/UL (ref 1.6–8.3)
NEUTROPHILS NFR BLD AUTO: 67 %
PLATELET # BLD AUTO: 266 10E3/UL (ref 150–450)
POTASSIUM BLD-SCNC: 3.9 MMOL/L (ref 3.4–5.3)
PROT SERPL-MCNC: 8.2 G/DL (ref 6.8–8.8)
RBC # BLD AUTO: 4.24 10E6/UL (ref 3.8–5.2)
SODIUM SERPL-SCNC: 137 MMOL/L (ref 133–144)
WBC # BLD AUTO: 10.2 10E3/UL (ref 4–11)

## 2022-03-24 PROCEDURE — 80053 COMPREHEN METABOLIC PANEL: CPT | Performed by: FAMILY MEDICINE

## 2022-03-24 PROCEDURE — 99213 OFFICE O/P EST LOW 20 MIN: CPT | Performed by: FAMILY MEDICINE

## 2022-03-24 PROCEDURE — 85025 COMPLETE CBC W/AUTO DIFF WBC: CPT | Performed by: FAMILY MEDICINE

## 2022-03-24 PROCEDURE — 36415 COLL VENOUS BLD VENIPUNCTURE: CPT | Performed by: FAMILY MEDICINE

## 2022-03-24 NOTE — NURSING NOTE
"Initial /86   Pulse 70   Temp 98.5  F (36.9  C) (Tympanic)   Wt 57.9 kg (127 lb 9.6 oz)   SpO2 98%   Breastfeeding No   BMI 26.13 kg/m   Estimated body mass index is 26.13 kg/m  as calculated from the following:    Height as of 2/17/22: 1.488 m (4' 10.6\").    Weight as of this encounter: 57.9 kg (127 lb 9.6 oz). .      "

## 2022-03-24 NOTE — PATIENT INSTRUCTIONS
Not sure the cause of your pain right now.  We'll start with some blood work tonight.    Please do not take any more imodium or pepto bismol for now.  You can take tylenol or ibuprofen as needed for pain.    We'll reach out tomorrow with lab results and get an update on your symptoms    If things get worse (fever, worsened pain, vomiting) please go in to the emergency room

## 2022-03-24 NOTE — PROGRESS NOTES
A/P:      ICD-10-CM    1. Abdominal pain, generalized  R10.84 Comprehensive metabolic panel (BMP + Alb, Alk Phos, ALT, AST, Total. Bili, TP)     CBC with platelets and differential     Comprehensive metabolic panel (BMP + Alb, Alk Phos, ALT, AST, Total. Bili, TP)     CBC with platelets and differential     Patient Instructions   Not sure the cause of your pain right now.  We'll start with some blood work tonight.    Please do not take any more imodium or pepto bismol for now.  You can take tylenol or ibuprofen as needed for pain.    We'll reach out tomorrow with lab results and get an update on your symptoms    If things get worse (fever, worsened pain, vomiting) please go in to the emergency room    Concerned for recurrent C diff but pt reports stool is not diarrhea so unable to test.  Begin with lab eval as above.  Stop all antidiarrhea medications.  If diarrhea develops will collect for c diff.  Pt verbalized understanding.    Raul Nicole is a 61 year old who presents for the following health issues    HPI     * abdominal pain for the last 3 days, no nausea, vomiting or diarrhea.  C/o urgent bowel movements, but no diarrhea     Pain began prior to a bowel movement.  Seems to come and go.  Not sure about aggravating factors.    Feels like when she eats she then develops abdominal pain and has to go have a bowel movement.  Ain/cramping begins after she is done eating.    Yesterday took an imodium and today took a pepto bismol.      States that she has had 6 bowel movements since she woke this morning.  Has the urge to go and then just has small very soft amounts.    No blood in the stool.  No vomiting, has been eating okay.  Abd pain improves after BM.    No fever    Diarrhea resolved after treatment with vancomycin last month.  Denies diarrhea multiple times today, reports stool is soft but not diarrhea        Review of Systems   Constitutional, HEENT, cardiovascular, pulmonary, gi and gu systems are  negative, except as otherwise noted.      Objective    /86   Pulse 70   Temp 98.5  F (36.9  C) (Tympanic)   Wt 57.9 kg (127 lb 9.6 oz)   SpO2 98%   Breastfeeding No   BMI 26.13 kg/m    Body mass index is 26.13 kg/m .  Physical Exam   PE:  VS as above   Gen:  WN/WD/WH female in NAD   Heart:  RRR without murmur, nl S1, S2, no rubs or gallops   Lungs CTA derek without rales/ronchi/wheezes   Abd: soft, postive bowel sounds, NT/ND, no HSM, no rebounding/guarding/ridigity      Epic reviewed

## 2022-03-25 ENCOUNTER — MYC MEDICAL ADVICE (OUTPATIENT)
Dept: FAMILY MEDICINE | Facility: CLINIC | Age: 62
End: 2022-03-25
Payer: COMMERCIAL

## 2022-03-25 DIAGNOSIS — R19.7 DIARRHEA OF PRESUMED INFECTIOUS ORIGIN: Primary | ICD-10-CM

## 2022-03-25 NOTE — TELEPHONE ENCOUNTER
Please call pt and let her know her blood work was normal which is great news.  Please get an update on her stools.  If she has developed diarrhea I would recommend stool tests.  If her stool remains formed I would recommend beginning a probiotic and fiber supplement (like benefiber or metamucil) once daily    If she does develop diarrhea I would recommend she notify us so a stool culture and c diff can be ordered.    Mona Sanchez, DO

## 2022-03-25 NOTE — TELEPHONE ENCOUNTER
Call placed to patient  Relayed Dr. Sanchez's message    Patient states after she left the clinic yesterday she had 2 diarrhea episodes  No diarrhea today - states stools are formed and soft    Will forward to Dr. Sanchez to determine if plan changes with 2 diarrhea episodes yesterday     Hadley Myers RN

## 2022-03-25 NOTE — TELEPHONE ENCOUNTER
Call placed to patient  Relayed Dr. Sanchez's message    Patient verbalized understanding  No further questions/concerns    Stool collection kit placed at the  for     Hadley Myers RN

## 2022-03-25 NOTE — TELEPHONE ENCOUNTER
I placed stool orders for her now that she can  here at clinic.  I would recommend she  the containers so she can do a collection with her next diarrhea stool.    Please emphasize to her that they cannot test formed stool so she can only collect and return stool that is in fact diarrhea.    I would still recommend the probiotic.  She can hold off on the fiber.

## 2022-03-27 ENCOUNTER — APPOINTMENT (OUTPATIENT)
Dept: CT IMAGING | Facility: HOSPITAL | Age: 62
End: 2022-03-27
Attending: EMERGENCY MEDICINE
Payer: COMMERCIAL

## 2022-03-27 ENCOUNTER — HOSPITAL ENCOUNTER (EMERGENCY)
Facility: HOSPITAL | Age: 62
Discharge: HOME | End: 2022-03-27
Attending: EMERGENCY MEDICINE | Admitting: EMERGENCY MEDICINE
Payer: COMMERCIAL

## 2022-03-27 VITALS
SYSTOLIC BLOOD PRESSURE: 107 MMHG | WEIGHT: 126 LBS | DIASTOLIC BLOOD PRESSURE: 51 MMHG | HEIGHT: 59 IN | BODY MASS INDEX: 25.4 KG/M2 | TEMPERATURE: 100.1 F | OXYGEN SATURATION: 97 % | RESPIRATION RATE: 20 BRPM | HEART RATE: 84 BPM

## 2022-03-27 DIAGNOSIS — K52.9 COLITIS: ICD-10-CM

## 2022-03-27 LAB
ALBUMIN SERPL-MCNC: 3.4 G/DL (ref 3.5–5)
ALP SERPL-CCNC: 66 U/L (ref 45–120)
ALT SERPL W P-5'-P-CCNC: 19 U/L (ref 0–45)
ANION GAP SERPL CALCULATED.3IONS-SCNC: 10 MMOL/L (ref 5–18)
AST SERPL W P-5'-P-CCNC: 15 U/L (ref 0–40)
BASOPHILS # BLD AUTO: 0 10E3/UL (ref 0–0.2)
BASOPHILS NFR BLD AUTO: 0 %
BILIRUB SERPL-MCNC: 0.5 MG/DL (ref 0–1)
BUN SERPL-MCNC: 9 MG/DL (ref 8–22)
C COLI+JEJUNI+LARI FUSA STL QL NAA+PROBE: NOT DETECTED
C DIFF TOX B STL QL: POSITIVE
CALCIUM SERPL-MCNC: 8.6 MG/DL (ref 8.5–10.5)
CHLORIDE BLD-SCNC: 101 MMOL/L (ref 98–107)
CO2 SERPL-SCNC: 26 MMOL/L (ref 22–31)
CREAT SERPL-MCNC: 0.77 MG/DL (ref 0.6–1.1)
EC STX1 GENE STL QL NAA+PROBE: NOT DETECTED
EC STX2 GENE STL QL NAA+PROBE: NOT DETECTED
EOSINOPHIL # BLD AUTO: 0.2 10E3/UL (ref 0–0.7)
EOSINOPHIL NFR BLD AUTO: 2 %
ERYTHROCYTE [DISTWIDTH] IN BLOOD BY AUTOMATED COUNT: 13.4 % (ref 10–15)
FLUAV RNA SPEC QL NAA+PROBE: NEGATIVE
FLUBV RNA RESP QL NAA+PROBE: NEGATIVE
GFR SERPL CREATININE-BSD FRML MDRD: 88 ML/MIN/1.73M2
GLUCOSE BLD-MCNC: 131 MG/DL (ref 70–125)
HCT VFR BLD AUTO: 41.6 % (ref 35–47)
HGB BLD-MCNC: 13.8 G/DL (ref 11.7–15.7)
IMM GRANULOCYTES # BLD: 0 10E3/UL
IMM GRANULOCYTES NFR BLD: 0 %
LACTATE SERPL-SCNC: 1.1 MMOL/L (ref 0.7–2)
LIPASE SERPL-CCNC: <9 U/L (ref 0–52)
LYMPHOCYTES # BLD AUTO: 1.1 10E3/UL (ref 0.8–5.3)
LYMPHOCYTES NFR BLD AUTO: 10 %
MAGNESIUM SERPL-MCNC: 1.9 MG/DL (ref 1.8–2.6)
MCH RBC QN AUTO: 31.9 PG (ref 26.5–33)
MCHC RBC AUTO-ENTMCNC: 33.2 G/DL (ref 31.5–36.5)
MCV RBC AUTO: 96 FL (ref 78–100)
MONOCYTES # BLD AUTO: 1 10E3/UL (ref 0–1.3)
MONOCYTES NFR BLD AUTO: 10 %
NEUTROPHILS # BLD AUTO: 8.5 10E3/UL (ref 1.6–8.3)
NEUTROPHILS NFR BLD AUTO: 78 %
NOROV GI+II ORF1-ORF2 JNC STL QL NAA+PR: NOT DETECTED
NRBC # BLD AUTO: 0 10E3/UL
NRBC BLD AUTO-RTO: 0 /100
PLATELET # BLD AUTO: 268 10E3/UL (ref 150–450)
POTASSIUM BLD-SCNC: 3.8 MMOL/L (ref 3.5–5)
PROT SERPL-MCNC: 7.2 G/DL (ref 6–8)
RBC # BLD AUTO: 4.33 10E6/UL (ref 3.8–5.2)
RVA NSP5 STL QL NAA+PROBE: NOT DETECTED
SALMONELLA SP RPOD STL QL NAA+PROBE: NOT DETECTED
SARS-COV-2 RNA RESP QL NAA+PROBE: NEGATIVE
SHIGELLA SP+EIEC IPAH STL QL NAA+PROBE: NOT DETECTED
SODIUM SERPL-SCNC: 137 MMOL/L (ref 136–145)
TROPONIN I SERPL-MCNC: <0.01 NG/ML (ref 0–0.29)
V CHOL+PARA RFBL+TRKH+TNAA STL QL NAA+PR: NOT DETECTED
WBC # BLD AUTO: 10.9 10E3/UL (ref 4–11)
Y ENTERO RECN STL QL NAA+PROBE: NOT DETECTED

## 2022-03-27 PROCEDURE — 99285 EMERGENCY DEPT VISIT HI MDM: CPT | Mod: 25

## 2022-03-27 PROCEDURE — 96361 HYDRATE IV INFUSION ADD-ON: CPT

## 2022-03-27 PROCEDURE — 250N000013 HC RX MED GY IP 250 OP 250 PS 637: Performed by: EMERGENCY MEDICINE

## 2022-03-27 PROCEDURE — 87493 C DIFF AMPLIFIED PROBE: CPT | Performed by: EMERGENCY MEDICINE

## 2022-03-27 PROCEDURE — 82040 ASSAY OF SERUM ALBUMIN: CPT | Performed by: EMERGENCY MEDICINE

## 2022-03-27 PROCEDURE — 80053 COMPREHEN METABOLIC PANEL: CPT | Performed by: EMERGENCY MEDICINE

## 2022-03-27 PROCEDURE — 258N000003 HC RX IP 258 OP 636: Performed by: EMERGENCY MEDICINE

## 2022-03-27 PROCEDURE — 87040 BLOOD CULTURE FOR BACTERIA: CPT | Mod: XU | Performed by: EMERGENCY MEDICINE

## 2022-03-27 PROCEDURE — 250N000011 HC RX IP 250 OP 636: Performed by: EMERGENCY MEDICINE

## 2022-03-27 PROCEDURE — 83735 ASSAY OF MAGNESIUM: CPT | Performed by: EMERGENCY MEDICINE

## 2022-03-27 PROCEDURE — 87636 SARSCOV2 & INF A&B AMP PRB: CPT | Performed by: EMERGENCY MEDICINE

## 2022-03-27 PROCEDURE — 74177 CT ABD & PELVIS W/CONTRAST: CPT

## 2022-03-27 PROCEDURE — 87506 IADNA-DNA/RNA PROBE TQ 6-11: CPT | Performed by: EMERGENCY MEDICINE

## 2022-03-27 PROCEDURE — C9803 HOPD COVID-19 SPEC COLLECT: HCPCS

## 2022-03-27 PROCEDURE — 85004 AUTOMATED DIFF WBC COUNT: CPT | Performed by: EMERGENCY MEDICINE

## 2022-03-27 PROCEDURE — 36415 COLL VENOUS BLD VENIPUNCTURE: CPT | Performed by: EMERGENCY MEDICINE

## 2022-03-27 PROCEDURE — 84484 ASSAY OF TROPONIN QUANT: CPT | Performed by: EMERGENCY MEDICINE

## 2022-03-27 PROCEDURE — 83690 ASSAY OF LIPASE: CPT | Performed by: EMERGENCY MEDICINE

## 2022-03-27 PROCEDURE — 83605 ASSAY OF LACTIC ACID: CPT | Performed by: EMERGENCY MEDICINE

## 2022-03-27 PROCEDURE — 96374 THER/PROPH/DIAG INJ IV PUSH: CPT | Mod: 59

## 2022-03-27 RX ORDER — IOPAMIDOL 755 MG/ML
100 INJECTION, SOLUTION INTRAVASCULAR ONCE
Status: COMPLETED | OUTPATIENT
Start: 2022-03-27 | End: 2022-03-27

## 2022-03-27 RX ORDER — ACETAMINOPHEN 325 MG/1
325-650 TABLET ORAL EVERY 6 HOURS PRN
COMMUNITY
End: 2023-03-21

## 2022-03-27 RX ORDER — VANCOMYCIN HYDROCHLORIDE 125 MG/1
125 CAPSULE ORAL 4 TIMES DAILY
Qty: 40 CAPSULE | Refills: 0 | Status: SHIPPED | OUTPATIENT
Start: 2022-03-27 | End: 2022-04-06

## 2022-03-27 RX ORDER — KETOROLAC TROMETHAMINE 30 MG/ML
15 INJECTION, SOLUTION INTRAMUSCULAR; INTRAVENOUS ONCE
Status: COMPLETED | OUTPATIENT
Start: 2022-03-27 | End: 2022-03-27

## 2022-03-27 RX ORDER — LOPERAMIDE HYDROCHLORIDE 2 MG/1
2 TABLET ORAL 4 TIMES DAILY PRN
COMMUNITY
End: 2023-03-21

## 2022-03-27 RX ORDER — ACETAMINOPHEN 325 MG/1
650 TABLET ORAL ONCE
Status: COMPLETED | OUTPATIENT
Start: 2022-03-27 | End: 2022-03-27

## 2022-03-27 RX ADMIN — IOPAMIDOL 100 ML: 755 INJECTION, SOLUTION INTRAVENOUS at 07:39

## 2022-03-27 RX ADMIN — KETOROLAC TROMETHAMINE 15 MG: 30 INJECTION, SOLUTION INTRAMUSCULAR at 09:58

## 2022-03-27 RX ADMIN — SODIUM CHLORIDE 1000 ML: 9 INJECTION, SOLUTION INTRAVENOUS at 06:16

## 2022-03-27 RX ADMIN — SODIUM CHLORIDE 1000 ML: 9 INJECTION, SOLUTION INTRAVENOUS at 08:40

## 2022-03-27 RX ADMIN — ACETAMINOPHEN 650 MG: 325 TABLET ORAL at 06:22

## 2022-03-27 RX ADMIN — FIDAXOMICIN 200 MG: 200 TABLET, FILM COATED ORAL at 08:49

## 2022-03-27 ASSESSMENT — ENCOUNTER SYMPTOMS
VOMITING: 0
DIFFICULTY URINATING: 0
COUGH: 0
ABDOMINAL PAIN: 1
HEADACHES: 0
CONFUSION: 0
FEVER: 1
RHINORRHEA: 0
BACK PAIN: 0
DIARRHEA: 1

## 2022-03-27 NOTE — PHARMACY-ADMISSION MEDICATION HISTORY
Pharmacy Note - Admission Medication History    Pertinent Provider Information: patient took vancomycin oral 125mg for a 10 day course 2/19/22 through 3/1/22. She states she completed the full course     ______________________________________________________________________    Prior To Admission (PTA) med list completed and updated in EMR.       PTA Med List   Medication Sig Last Dose     acetaminophen (TYLENOL) 325 MG tablet Take 325-650 mg by mouth every 6 hours as needed for mild pain 3/26/2022 at pm     loperamide (IMODIUM A-D) 2 MG tablet Take 2 mg by mouth 4 times daily as needed for diarrhea 3/26/2022 at pm       Information source(s): Patient and CareEverywhere/[employer]  Method of interview communication: phone    Summary of Changes to PTA Med List  New: acetaminophen, loperamide  Discontinued: vancomycin  Changed: none    Patient was asked about OTC/herbal products specifically.  PTA med list reflects this.    In the past week, patient estimated taking medication this percent of the time:  greater than 90%.    Allergies were reviewed, assessed, and updated with the patient.      Patient does not use any multi-dose medications prior to admission.    The information provided in this note is only as accurate as the sources available at the time of the update(s).    Thank you for the opportunity to participate in the care of this patient.    Augusta Whalen  3/27/2022 7:48 AM

## 2022-03-27 NOTE — ED TRIAGE NOTES
"Diarrhea and stomach cramps for 5 days. States she was seen for this on Thursday. She states no meds were prescribed. She states she took \"a little Pepto\". She can eat and drink, \"but it runs right through me\". Cramps are a 10. She denies bloody stool.  "

## 2022-03-27 NOTE — ED NOTES
Pt's spouse wondering if pt can have something to eat now to see if she has a bm, informed him would check with provider.

## 2022-03-27 NOTE — ED PROVIDER NOTES
eMERGENCY dEPARTMENT PROGRESS NOTE         ED COURSE AND MEDICAL DECISION MAKING  Patient was signed out to me by Bernard Pfeiffer MD at 7:11 AM    8:08 AM I rechecked the patient.   9:22 AM I rechecked and updated the patient.  10:11 AM I rechecked and updated the patient.     Bonnie Castaneda is a 61 year old female who presented to the ED for evaluation of diarrhea.     ED Course as of 03/27/22 1108   Sun Mar 27, 2022   0653 62 yo F who presents with watery diarrhea, fever. 102.8 F here on arrival. CT abd/pelvis pending. Labs so far reassuring. May need admission due to initial presentation with tachycardia, fever. Pt had hx of c. Diff last month, treated with vancomycin   0733 I met the pt, introduced myself, she is heading to CT now   0805 CT Abdomen Pelvis w Contrast  1.  Patient has a pancolitis with changes greatest involving the ascending colon and proximal transverse colon. There are a few reactive nodes in the ileocolic mesentery without any other complications.  2.  Prior sigmoid resection with staple line.  3.  Incidental right renal cysts.   0820 I talked to the patient again, she was unaware that she had C. difficile colitis last month although the watery diarrhea she had been improved with vancomycin.  Her symptoms are much worse over the past few days.  Her heart rate has normalized, fever has come down.  First liter of normal saline is complete and I ordered a second.  I will give her first dose of Fidaxomicin here. I offered admission vs home management of sx if she is feeling improved after initial rehydration. She and her  prefer home, but we will reassess after 2nd liter   0847 Magnesium: 1.9   0929 PO trial here, 2nd liter NS finishing   1048 Patient has had almost a full meal here, had some cramping which was improved with Toradol.  Both her and her  are motivated to go home, I discussed return precautions closely.  They were sent home with written prescriptions for fidaxomicin as  well as vancomycin in case the pharmacy does not have any in stock or their insurance plan does not cover.  We discussed symptomatic control at home and follow-up.          At the conclusion of the encounter I discussed the results of all of the tests and the disposition. The questions were answered. The patient or family acknowledged understanding and was agreeable with the care plan.     LAB  Pertinent labs results reviewed   Results for orders placed or performed during the hospital encounter of 03/27/22   CT Abdomen Pelvis w Contrast    Impression    IMPRESSION:   1.  Patient has a pancolitis with changes greatest involving the ascending colon and proximal transverse colon. There are a few reactive nodes in the ileocolic mesentery without any other complications.  2.  Prior sigmoid resection with staple line.  3.  Incidental right renal cysts.   Comprehensive metabolic panel   Result Value Ref Range    Sodium 137 136 - 145 mmol/L    Potassium 3.8 3.5 - 5.0 mmol/L    Chloride 101 98 - 107 mmol/L    Carbon Dioxide (CO2) 26 22 - 31 mmol/L    Anion Gap 10 5 - 18 mmol/L    Urea Nitrogen 9 8 - 22 mg/dL    Creatinine 0.77 0.60 - 1.10 mg/dL    Calcium 8.6 8.5 - 10.5 mg/dL    Glucose 131 (H) 70 - 125 mg/dL    Alkaline Phosphatase 66 45 - 120 U/L    AST 15 0 - 40 U/L    ALT 19 0 - 45 U/L    Protein Total 7.2 6.0 - 8.0 g/dL    Albumin 3.4 (L) 3.5 - 5.0 g/dL    Bilirubin Total 0.5 0.0 - 1.0 mg/dL    GFR Estimate 88 >60 mL/min/1.73m2   Lactic acid whole blood   Result Value Ref Range    Lactic Acid 1.1 0.7 - 2.0 mmol/L   Troponin I (now)   Result Value Ref Range    Troponin I <0.01 0.00 - 0.29 ng/mL   Symptomatic; Unknown Influenza A/B & SARS-CoV2 (COVID-19) Virus PCR Multiplex Nasopharyngeal    Specimen: Nasopharyngeal; Swab   Result Value Ref Range    Influenza A PCR Negative Negative    Influenza B PCR Negative Negative    SARS CoV2 PCR Negative Negative   CBC with platelets and differential   Result Value Ref Range     WBC Count 10.9 4.0 - 11.0 10e3/uL    RBC Count 4.33 3.80 - 5.20 10e6/uL    Hemoglobin 13.8 11.7 - 15.7 g/dL    Hematocrit 41.6 35.0 - 47.0 %    MCV 96 78 - 100 fL    MCH 31.9 26.5 - 33.0 pg    MCHC 33.2 31.5 - 36.5 g/dL    RDW 13.4 10.0 - 15.0 %    Platelet Count 268 150 - 450 10e3/uL    % Neutrophils 78 %    % Lymphocytes 10 %    % Monocytes 10 %    % Eosinophils 2 %    % Basophils 0 %    % Immature Granulocytes 0 %    NRBCs per 100 WBC 0 <1 /100    Absolute Neutrophils 8.5 (H) 1.6 - 8.3 10e3/uL    Absolute Lymphocytes 1.1 0.8 - 5.3 10e3/uL    Absolute Monocytes 1.0 0.0 - 1.3 10e3/uL    Absolute Eosinophils 0.2 0.0 - 0.7 10e3/uL    Absolute Basophils 0.0 0.0 - 0.2 10e3/uL    Absolute Immature Granulocytes 0.0 <=0.4 10e3/uL    Absolute NRBCs 0.0 10e3/uL   Result Value Ref Range    Lipase <9 0 - 52 U/L   Result Value Ref Range    Magnesium 1.9 1.8 - 2.6 mg/dL         RADIOLOGY    Pertinent imaging reviewed   Please see official radiology report.  CT Abdomen Pelvis w Contrast   Final Result   IMPRESSION:    1.  Patient has a pancolitis with changes greatest involving the ascending colon and proximal transverse colon. There are a few reactive nodes in the ileocolic mesentery without any other complications.   2.  Prior sigmoid resection with staple line.   3.  Incidental right renal cysts.          FINAL IMPRESSION    1. Colitis         DISCHARGE PRESCRIPTIONS  New Prescriptions    FIDAXOMICIN (DIFICID) 200 MG TABLET    Take 1 tablet (200 mg) by mouth 2 times daily for 10 days    VANCOMYCIN (VANCOCIN) 125 MG CAPSULE    Take 1 capsule (125 mg) by mouth 4 times daily for 10 days     Chirag Bowling M.D.  Emergency Medicine  [facility] EMERGENCY DEPARTMENT  [address] [address]  [phone]  Dept: [phone]     Chirag Bowling MD  03/27/22 1111

## 2022-03-27 NOTE — ED PROVIDER NOTES
EMERGENCY DEPARTMENT ENCOUNTER      NAME: Bonnie Castaneda  AGE: 61 year old female  YOB: 1960  MRN: [id number]  EVALUATION DATE & TIME: 3/27/2022  5:48 AM    PCP: Laly Barclay    ED PROVIDER: Meir Pfeiffer MD        Chief Complaint   Patient presents with     Diarrhea     cramps and diarrha for 5 days.         FINAL IMPRESSION:  No diagnosis found.      ED COURSE & MEDICAL DECISION MAKING:    Pertinent Labs & Imaging studies reviewed. (See chart for details)  61 year old female presents to the Emergency Department for evaluation of fever, abdominal cramps, diarrhea    Plan for labs, stool testing, CT imaging    Signed out to Dr. Bowling pending labs and imaging.     5:59 AM I met with the patient for the initial interview and physical examination. Discussed plan for treatment and workup in the ED.  PPE: Provider wore gloves, N95 mask, eye protection, and paper mask.      At the conclusion of the encounter I discussed the results of all of the tests and the disposition. The questions were answered. The patient or family acknowledged understanding and was agreeable with the care plan.     ED Course as of 03/27/22 0743   Sun Mar 27, 2022   0653 62 yo F who presents with watery diarrhea, fever. 102.8 F here on arrival. CT abd/pelvis pending. Labs so far reassuring. May need admission due to initial presentation with tachycardia, fever. Pt had hx of c. Diff last month, treated with vancomycin   0733 I met the pt, introduced myself, she is heading to CT now               MEDICATIONS GIVEN IN THE EMERGENCY:  Medications   0.9% sodium chloride BOLUS (1,000 mLs Intravenous New Bag 3/27/22 0616)   acetaminophen (TYLENOL) tablet 650 mg (650 mg Oral Given 3/27/22 0622)   iopamidol (ISOVUE-370) solution 100 mL (100 mLs Intravenous Given 3/27/22 0739)       NEW PRESCRIPTIONS STARTED AT TODAY'S ER VISIT  New Prescriptions    No medications on file       "    =================================================================    HPI    Triage note  \"Diarrhea and stomach cramps for 5 days. States she was seen for this on Thursday. She states no meds were prescribed. She states she took \"a little Pepto\". She can eat and drink, \"but it runs right through me\". Cramps are a 10. She denies bloody stool.  \"      Patient information was obtained from: The patient    Use of : N/A       Bonnie Castaneda is a 61 year old female with a pertinent history of diverticulitis who presents to this ED via walk-in for evaluation of diarrhea.    The patient reports that she has had 5 days of fever and watery stools with an associated mostly centralized abdominal cramping. She notes a history of diverticulitis which she had surgery for ~14 years ago. Her current symptoms feel similar to her prior pain associated with diverticulitis. The patient was at the clinic the other day and was advised to take Pepto-Bismol. She was recently in the hospital with here mom who has lung cancer but her mother did not have any GI issues. The patient denies any recent travel or recent use of antibiotics. Denies difficulty urinating, frequency, vomiting, cough, rhinorrhea, rash, and any other symptoms or complaints at this time.     Per Chart Review,  3/24/2022 The patient presented to [facility] for evaluation of abdominal pain. Blood work was done. Advised to discontinue imodium or Pepto bismol and take Tylenol and Ibuprofen as needed for pain.    REVIEW OF SYSTEMS   Review of Systems   Constitutional: Positive for fever.   HENT: Negative for rhinorrhea.    Respiratory: Negative for cough.    Gastrointestinal: Positive for abdominal pain (cramping) and diarrhea. Negative for vomiting.   Genitourinary: Negative for difficulty urinating.   Musculoskeletal: Negative for back pain.   Skin: Negative for rash.   Allergic/Immunologic: Negative for immunocompromised state.   Neurological: " "Negative for headaches.   Psychiatric/Behavioral: Negative for confusion.       PAST MEDICAL HISTORY:  Past Medical History:   Diagnosis Date     Diverticulitis        PAST SURGICAL HISTORY:  Past Surgical History:   Procedure Laterality Date     HC DILATION/CURETTAGE DIAG/THER NON OB      Description: Dilation And Curettage;  Recorded: 09/02/2009;  Comments: Times 2           CURRENT MEDICATIONS:    No current outpatient medications on file.      ALLERGIES:  No Known Allergies    FAMILY HISTORY:  Family History   Problem Relation Age of Onset     Heart Disease Father      Heart Disease Brother        SOCIAL HISTORY:   Social History     Socioeconomic History     Marital status:      Spouse name: Not on file     Number of children: Not on file     Years of education: Not on file     Highest education level: Not on file   Occupational History     Not on file   Tobacco Use     Smoking status: Never Smoker     Smokeless tobacco: Never Used   Substance and Sexual Activity     Alcohol use: No     Drug use: Not on file     Sexual activity: Yes     Birth control/protection: Post-menopausal   Other Topics Concern     Not on file   Social History Narrative     Not on file     Social Determinants of Health     Financial Resource Strain: Not on file   Food Insecurity: Not on file   Transportation Needs: Not on file   Physical Activity: Not on file   Stress: Not on file   Social Connections: Not on file   Intimate Partner Violence: Not on file   Housing Stability: Not on file       VITALS:  /61   Pulse 87   Temp 100.1  F (37.8  C) (Oral)   Resp 20   Ht 1.499 m (4' 11\")   Wt 57.2 kg (126 lb)   SpO2 97%   BMI 25.45 kg/m      PHYSICAL EXAM      Vitals: /61   Pulse 87   Temp 100.1  F (37.8  C) (Oral)   Resp 20   Ht 1.499 m (4' 11\")   Wt 57.2 kg (126 lb)   SpO2 97%   BMI 25.45 kg/m    General: Appears in no acute distress, awake, alert, interactive.  Eyes: Conjunctivae non-injected. Sclera " anicteric.  HENT: Atraumatic.  Neck: Supple.  Respiratory/Chest: Respiration unlabored. 2+ radial pulses.  Abdomen: non distended. Diffuse mild abdominal tenderness. No rigidity.   Musculoskeletal: Normal extremities. No edema or erythema.  Skin: Normal color. No rash or diaphoresis.  Neurologic: Face symmetric, moves all extremities spontaneously. Speech clear.  Psychiatric: Oriented to person, place, and time. Affect appropriate.       LAB:  All pertinent labs reviewed and interpreted.  Results for orders placed or performed during the hospital encounter of 03/27/22   Comprehensive metabolic panel   Result Value Ref Range    Sodium 137 136 - 145 mmol/L    Potassium 3.8 3.5 - 5.0 mmol/L    Chloride 101 98 - 107 mmol/L    Carbon Dioxide (CO2) 26 22 - 31 mmol/L    Anion Gap 10 5 - 18 mmol/L    Urea Nitrogen 9 8 - 22 mg/dL    Creatinine 0.77 0.60 - 1.10 mg/dL    Calcium 8.6 8.5 - 10.5 mg/dL    Glucose 131 (H) 70 - 125 mg/dL    Alkaline Phosphatase 66 45 - 120 U/L    AST 15 0 - 40 U/L    ALT 19 0 - 45 U/L    Protein Total 7.2 6.0 - 8.0 g/dL    Albumin 3.4 (L) 3.5 - 5.0 g/dL    Bilirubin Total 0.5 0.0 - 1.0 mg/dL    GFR Estimate 88 >60 mL/min/1.73m2   Lactic acid whole blood   Result Value Ref Range    Lactic Acid 1.1 0.7 - 2.0 mmol/L   Troponin I (now)   Result Value Ref Range    Troponin I <0.01 0.00 - 0.29 ng/mL   Symptomatic; Unknown Influenza A/B & SARS-CoV2 (COVID-19) Virus PCR Multiplex Nasopharyngeal    Specimen: Nasopharyngeal; Swab   Result Value Ref Range    Influenza A PCR Negative Negative    Influenza B PCR Negative Negative    SARS CoV2 PCR Negative Negative   CBC with platelets and differential   Result Value Ref Range    WBC Count 10.9 4.0 - 11.0 10e3/uL    RBC Count 4.33 3.80 - 5.20 10e6/uL    Hemoglobin 13.8 11.7 - 15.7 g/dL    Hematocrit 41.6 35.0 - 47.0 %    MCV 96 78 - 100 fL    MCH 31.9 26.5 - 33.0 pg    MCHC 33.2 31.5 - 36.5 g/dL    RDW 13.4 10.0 - 15.0 %    Platelet Count 268 150 - 450 10e3/uL     % Neutrophils 78 %    % Lymphocytes 10 %    % Monocytes 10 %    % Eosinophils 2 %    % Basophils 0 %    % Immature Granulocytes 0 %    NRBCs per 100 WBC 0 <1 /100    Absolute Neutrophils 8.5 (H) 1.6 - 8.3 10e3/uL    Absolute Lymphocytes 1.1 0.8 - 5.3 10e3/uL    Absolute Monocytes 1.0 0.0 - 1.3 10e3/uL    Absolute Eosinophils 0.2 0.0 - 0.7 10e3/uL    Absolute Basophils 0.0 0.0 - 0.2 10e3/uL    Absolute Immature Granulocytes 0.0 <=0.4 10e3/uL    Absolute NRBCs 0.0 10e3/uL   Result Value Ref Range    Lipase <9 0 - 52 U/L       RADIOLOGY:  Reviewed all pertinent imaging. Please see official radiology report.  CT Abdomen Pelvis w Contrast    (Results Pending)           I, Dennis Medina, am serving as a scribe to document services personally performed by Bernard Pfeiffer MD based on my observation and the provider's statements to me. I, Dr. Bernard Pfeiffer, attest that Dennis Medina is acting in a scribe capacity, has observed my performance of the services and has documented them in accordance with my direction.    Bernard Pfeiffer MD  Emergency Medicine  [facility] EMERGENCY DEPARTMENT  [address] [id number] [phone]     Bernard Pfeiffer MD  03/27/22 0751

## 2022-03-27 NOTE — ED NOTES
Pt c/o abdominal cramping and feels like she has to go to bathrooom for bm, pt taken to bathroom, pt voided with small flecks of stool mixed urine.  Back to room, provider in to see pt.

## 2022-03-28 ENCOUNTER — MYC MEDICAL ADVICE (OUTPATIENT)
Dept: FAMILY MEDICINE | Facility: CLINIC | Age: 62
End: 2022-03-28
Payer: COMMERCIAL

## 2022-03-29 ENCOUNTER — VIRTUAL VISIT (OUTPATIENT)
Dept: FAMILY MEDICINE | Facility: CLINIC | Age: 62
End: 2022-03-29
Payer: COMMERCIAL

## 2022-03-29 DIAGNOSIS — A04.72 C. DIFFICILE COLITIS: Primary | ICD-10-CM

## 2022-03-29 PROCEDURE — 99214 OFFICE O/P EST MOD 30 MIN: CPT | Mod: TEL | Performed by: FAMILY MEDICINE

## 2022-03-29 NOTE — PROGRESS NOTES
Bonnie is a 61 year old who is being evaluated via a billable video visit.      How would you like to obtain your AVS? MyChart  If the video visit is dropped, the invitation should be resent by: Text to cell phone: [phone]  Will anyone else be joining your video visit? 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 Took pepto but was not much help        -------------------------    Assessment/Plan:    Bonnie Castaneda is a 61 year old female presenting for:    C. difficile colitis  It is interesting because in the hospital it looks like she stated that the vancomycin was helpful however she tells me that initially it was not.  Dificid sent to the pharmacy.  Referral for gastroenterology sent as well in case she does not improve.  She is not having any further fevers or bloody stools.  It may be reasonable to do limited Imodium.  Discussed using a barrier protector perirectally.  - fidaxomicin (DIFICID) 200 MG tablet  Dispense: 20 tablet; Refill: 0  - Adult Gastro Ref - Consult Only        Medications Discontinued During This Encounter   Medication Reason     fidaxomicin (DIFICID) 200 MG tablet  "Reorder           Chief Complaint:  Infection          Subjective:   Bonnie Castaneda is a pleasant 61 year old female being evaluated via video visit today for the following concern/s:     Diarrhea: Patient has been experiencing diarrhea for the last month and a half.  She was seen here in clinic and was told that she was diagnosed with \"C. difficile\" but did not feel so she left any samples.  She thought this was a diagnosis that was made based on her clinical presentation.  I can clearly see that we have results of stool samples on 217 but patient is adamant that she did not need these.  Either way, stool samples came positive for the C. difficile and she was started on vancomycin.  She took this medication and states that it did seem to help a bit.  She unfortunately then got a bit worse and on 27 March was seen in the emergency department with a fever and tachycardia.  She had a CT scan that showed pancolitis.  Stool sample came back positive for C. difficile once again.  She was started on a longer dose of vancomycin.  She states that she has not taken the antibiotic as she wants to know what she should do.    She continues to have 8-10 stools daily.  No blood in the stool.  Eating and drinking well.  Mild nausea.  Irritation perirectally.    She states Imodium does help she was told not to take it.      12 point review of systems completed and negative except for what has been described above    History   Smoking Status     Never Smoker   Smokeless Tobacco     Never Used         Current Outpatient Medications:      acetaminophen (TYLENOL) 325 MG tablet, Take 325-650 mg by mouth every 6 hours as needed for mild pain, Disp: , Rfl:      fidaxomicin (DIFICID) 200 MG tablet, Take 1 tablet (200 mg) by mouth 2 times daily, Disp: 20 tablet, Rfl: 0     vancomycin (VANCOCIN) 125 MG capsule, Take 1 capsule (125 mg) by mouth 4 times daily for 10 days, Disp: 40 capsule, Rfl: 0     loperamide (IMODIUM A-D) 2 MG tablet, Take " 2 mg by mouth 4 times daily as needed for diarrhea (Patient not taking: Reported on 3/29/2022), Disp: , Rfl:         Objective:  No vitals were done due to the nature of this visit  No flowsheet data found.            General: No acute distress  Psych: Appropriate affect  HEENT: moist mucous membranes  Pulmonary: Breathing comfortably, speaking in complete sentences  Extremities: warm and well perfused with no edema  Skin: warm and dry with no rash       This note has been dictated and transcribed using voice recognition software.   Any errors in transcription are unintentional and inherent to the software.      Video-Visit Details    Type of service:  Video Visit    Video End Time:304    I personally spent over 30 minutes performing care related to this patient on the day of the patient visit.  This includes chart review, precharting, talking with/ examining the patient as well as completing after visit documentation.      Originating Location (pt. Location): Home    Distant Location (provider location):  [facility]     Platform used for Video Visit: [employer]

## 2022-04-01 LAB
BACTERIA BLD CULT: NO GROWTH
BACTERIA BLD CULT: NO GROWTH

## 2022-11-20 ENCOUNTER — HEALTH MAINTENANCE LETTER (OUTPATIENT)
Age: 62
End: 2022-11-20

## 2022-12-24 ENCOUNTER — HEALTH MAINTENANCE LETTER (OUTPATIENT)
Age: 62
End: 2022-12-24

## 2023-02-03 ENCOUNTER — IMMUNIZATION (OUTPATIENT)
Dept: FAMILY MEDICINE | Facility: CLINIC | Age: 63
End: 2023-02-03
Payer: COMMERCIAL

## 2023-02-03 PROCEDURE — 0124A COVID-19 VACCINE BIVALENT BOOSTER 12+ (PFIZER): CPT

## 2023-02-03 PROCEDURE — 91312 COVID-19 VACCINE BIVALENT BOOSTER 12+ (PFIZER): CPT

## 2023-03-20 PROBLEM — R73.03 PREDIABETES: Status: RESOLVED | Noted: 2017-09-01 | Resolved: 2023-03-20

## 2023-03-20 PROBLEM — E78.5 HYPERLIPIDEMIA: Status: RESOLVED | Noted: 2017-09-01 | Resolved: 2023-03-20

## 2023-03-21 ENCOUNTER — LAB (OUTPATIENT)
Dept: FAMILY MEDICINE | Facility: CLINIC | Age: 63
End: 2023-03-21

## 2023-03-21 ENCOUNTER — OFFICE VISIT (OUTPATIENT)
Dept: FAMILY MEDICINE | Facility: CLINIC | Age: 63
End: 2023-03-21
Payer: COMMERCIAL

## 2023-03-21 VITALS
DIASTOLIC BLOOD PRESSURE: 82 MMHG | BODY MASS INDEX: 27.09 KG/M2 | WEIGHT: 134.38 LBS | RESPIRATION RATE: 12 BRPM | SYSTOLIC BLOOD PRESSURE: 130 MMHG | TEMPERATURE: 97.8 F | HEIGHT: 59 IN | HEART RATE: 56 BPM | OXYGEN SATURATION: 100 %

## 2023-03-21 DIAGNOSIS — Z12.11 SCREEN FOR COLON CANCER: ICD-10-CM

## 2023-03-21 DIAGNOSIS — Z00.00 HEALTHCARE MAINTENANCE: Primary | ICD-10-CM

## 2023-03-21 DIAGNOSIS — Z82.49 FAMILY HISTORY OF ISCHEMIC HEART DISEASE: ICD-10-CM

## 2023-03-21 DIAGNOSIS — Z12.4 CERVICAL CANCER SCREENING: ICD-10-CM

## 2023-03-21 DIAGNOSIS — L65.9 HAIR LOSS: ICD-10-CM

## 2023-03-21 DIAGNOSIS — Z12.31 VISIT FOR SCREENING MAMMOGRAM: ICD-10-CM

## 2023-03-21 LAB
ANION GAP SERPL CALCULATED.3IONS-SCNC: 12 MMOL/L (ref 7–15)
BUN SERPL-MCNC: 16.9 MG/DL (ref 8–23)
CALCIUM SERPL-MCNC: 9.7 MG/DL (ref 8.8–10.2)
CHLORIDE SERPL-SCNC: 102 MMOL/L (ref 98–107)
CHOLEST SERPL-MCNC: 237 MG/DL
CREAT SERPL-MCNC: 0.82 MG/DL (ref 0.51–0.95)
DEPRECATED HCO3 PLAS-SCNC: 26 MMOL/L (ref 22–29)
ERYTHROCYTE [DISTWIDTH] IN BLOOD BY AUTOMATED COUNT: 13.6 % (ref 10–15)
FERRITIN SERPL-MCNC: 251 NG/ML (ref 11–328)
GFR SERPL CREATININE-BSD FRML MDRD: 81 ML/MIN/1.73M2
GLUCOSE SERPL-MCNC: 103 MG/DL (ref 70–99)
HCT VFR BLD AUTO: 41.3 % (ref 35–47)
HDLC SERPL-MCNC: 59 MG/DL
HGB BLD-MCNC: 13.5 G/DL (ref 11.7–15.7)
LDLC SERPL CALC-MCNC: 151 MG/DL
MCH RBC QN AUTO: 31.7 PG (ref 26.5–33)
MCHC RBC AUTO-ENTMCNC: 32.7 G/DL (ref 31.5–36.5)
MCV RBC AUTO: 97 FL (ref 78–100)
NONHDLC SERPL-MCNC: 178 MG/DL
PLATELET # BLD AUTO: 280 10E3/UL (ref 150–450)
POTASSIUM SERPL-SCNC: 4.2 MMOL/L (ref 3.4–5.3)
RBC # BLD AUTO: 4.26 10E6/UL (ref 3.8–5.2)
SODIUM SERPL-SCNC: 140 MMOL/L (ref 136–145)
T4 FREE SERPL-MCNC: 0.81 NG/DL (ref 0.9–1.7)
TRIGL SERPL-MCNC: 137 MG/DL
TSH SERPL DL<=0.005 MIU/L-ACNC: 5.56 UIU/ML (ref 0.3–4.2)
WBC # BLD AUTO: 7.3 10E3/UL (ref 4–11)

## 2023-03-21 PROCEDURE — 82306 VITAMIN D 25 HYDROXY: CPT | Performed by: FAMILY MEDICINE

## 2023-03-21 PROCEDURE — 87624 HPV HI-RISK TYP POOLED RSLT: CPT | Performed by: FAMILY MEDICINE

## 2023-03-21 PROCEDURE — 80061 LIPID PANEL: CPT | Performed by: FAMILY MEDICINE

## 2023-03-21 PROCEDURE — G0145 SCR C/V CYTO,THINLAYER,RESCR: HCPCS | Performed by: FAMILY MEDICINE

## 2023-03-21 PROCEDURE — 80048 BASIC METABOLIC PNL TOTAL CA: CPT | Performed by: FAMILY MEDICINE

## 2023-03-21 PROCEDURE — 99396 PREV VISIT EST AGE 40-64: CPT | Performed by: FAMILY MEDICINE

## 2023-03-21 PROCEDURE — 84443 ASSAY THYROID STIM HORMONE: CPT | Performed by: FAMILY MEDICINE

## 2023-03-21 PROCEDURE — 85027 COMPLETE CBC AUTOMATED: CPT | Performed by: FAMILY MEDICINE

## 2023-03-21 PROCEDURE — 82728 ASSAY OF FERRITIN: CPT | Performed by: FAMILY MEDICINE

## 2023-03-21 PROCEDURE — 99213 OFFICE O/P EST LOW 20 MIN: CPT | Mod: 25 | Performed by: FAMILY MEDICINE

## 2023-03-21 PROCEDURE — 84439 ASSAY OF FREE THYROXINE: CPT | Performed by: FAMILY MEDICINE

## 2023-03-21 PROCEDURE — 36415 COLL VENOUS BLD VENIPUNCTURE: CPT | Performed by: FAMILY MEDICINE

## 2023-03-21 ASSESSMENT — ENCOUNTER SYMPTOMS
FEVER: 0
COUGH: 0
CONSTIPATION: 0
NERVOUS/ANXIOUS: 0
NAUSEA: 0
PALPITATIONS: 0
HEMATURIA: 0
FREQUENCY: 0
ABDOMINAL PAIN: 0
HEARTBURN: 0
BREAST MASS: 0
SORE THROAT: 0
DIZZINESS: 0
PARESTHESIAS: 0
EYE PAIN: 0
MYALGIAS: 0
SHORTNESS OF BREATH: 0
ARTHRALGIAS: 0
DIARRHEA: 0
CHILLS: 0
HEMATOCHEZIA: 0
WEAKNESS: 0
JOINT SWELLING: 0
HEADACHES: 0
DYSURIA: 0

## 2023-03-21 NOTE — PROGRESS NOTES
"Answers for HPI/ROS submitted by the patient on 3/21/2023  Frequency of exercise:: 6-7 days/week  Getting at least 3 servings of Calcium per day:: Yes  Diet:: Regular (no restrictions)  Taking medications regularly:: Not Applicable  Medication side effects:: None  Bi-annual eye exam:: Yes  Dental care twice a year:: Yes  Sleep apnea or symptoms of sleep apnea:: None  abdominal pain: No  Blood in stool: No  Blood in urine: No  chest pain: No  chills: No  congestion: No  constipation: No  cough: No  diarrhea: No  dizziness: No  ear pain: No  eye pain: No  nervous/anxious: No  fever: No  frequency: No  genital sores: No  headaches: No  hearing loss: No  heartburn: No  arthralgias: No  joint swelling: No  peripheral edema: No  mood changes: No  myalgias: No  nausea: No  dysuria: No  palpitations: No  Skin sensation changes: No  sore throat: No  urgency: No  rash: No  shortness of breath: No  visual disturbance: No  weakness: No  pelvic pain: No  vaginal bleeding: No  vaginal discharge: No  tenderness: No  breast mass: No  breast discharge: No  Additional concerns today:: Yes  Duration of exercise:: 15-30 minutes        Assessment/Plan:     Health maintenance female exam.  All questions answered.  Await pap smear results.  Breast self exam technique reviewed and patient encouraged to perform self-exam monthly.  Discussed healthy lifestyle modifications.  Mammogram ordered.  Await fasting lab results    BMI:   Estimated body mass index is 27.14 kg/m  as calculated from the following:    Height as of this encounter: 1.499 m (4' 11\").    Weight as of this encounter: 61 kg (134 lb 6 oz).   Weight management plan: Discussed healthy diet and exercise guidelines      Healthcare maintenance  - REVIEW OF HEALTH MAINTENANCE PROTOCOL ORDERS  - Lipid panel reflex to direct LDL Fasting  - TSH with free T4 reflex  - BASIC METABOLIC PANEL  - CBC with Platelets  - Vitamin D Deficiency  - Ferritin  - Lipid panel reflex to direct LDL " Fasting  - TSH with free T4 reflex  - BASIC METABOLIC PANEL  - CBC with Platelets  - Vitamin D Deficiency  - Ferritin    Cervical cancer screening  - Pap screen with HPV - recommended age 30 - 65 years    Screen for colon cancer  Patient prefers to do Cologuard over colonoscopy and this was sent to the pharmacy.  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  Laboratory testing done as below.  Discussed biotin supplement.  - TSH with free T4 reflex  - Ferritin  - TSH with free T4 reflex  - Ferritin    Family history of ischemic heart disease  Due to family history of ischemic heart disease we discussed either talking with cardiology or doing a CT coronary calcium scan.  We discussed that this will only look at the vessels of her heart but I think would be a good place to start.  I placed the order today for the CT coronary calcium scan.  Discussed that when she gets that done I will alert her with the results and we can discuss further steps in management if needed.  - CT Coronary Calcium Scan        Patient has been advised of split billing requirements and indicates understanding: Yes      Subjective:     Bonnie Castaneda is a 62 year old female who presents for an annual exam.  She unfortunately has been having some difficulties this past year.  Her mother passed away This past July and she is still grieving from this.  She does state that she is a good support group both with the [employer] as well as family and friends.    Her uncle passed away a few weeks ago as well which has been somewhat difficulty.    She notes that a few months after her mother passed away she began to experience hair loss.  This is slowed a bit but she worries about it.  She does not have any patches of hair loss and notes that this is mostly here on her scalp that has been lost.    She again would  "like to discuss cardiac testing.  At her visit last year she mentioned that there is a strong family history of coronary artery disease.  At that time we had made plans for her to see a cardiologist to discuss any further testing.  She is asymptomatic.  She would like to do a \"full body\" vessel scan to see if there is any plaque buildup.  She is very worried about this.  Cholesterol last year was slightly elevated to 211 with an ASCVD vascular event risk of 3%.        Healthy Habits:   Regular Exercise: Yes  Sunscreen Use: Yes  Healthy Diet: yes  Dental Visits Regularly: yes  Seat Belt: Yes  Self Breast Exam Monthly: yes  Colonoscopy: pt states she has been doing testing through 's work but they are no longer doing the testing - unsure if FIT test or cologuard  Prevention of Osteoporosis: yes      Immunization History   Administered Date(s) Administered     COVID-19 Vaccine 12+ (Pfizer) 2021, 2021, 2021     COVID-19 Vaccine Bivalent Booster 12+ (Pfizer) 2023     FLU 6-35 months 10/28/2009     Influenza (IIV3) PF 10/28/2010, 10/27/2011, 2012, 10/09/2013, 10/16/2014     Influenza Vaccine 50-64 or 18-64 w/egg allergy (Flublok) 2020, 10/25/2021     Influenza Vaccine >6 months (Alfuria,Fluzone) 10/05/2017     Influenza Vaccine, 6+MO IM (QUADRIVALENT W/PRESERVATIVES) 10/15/2015     MMR 2003     Tdap (Adacel,Boostrix) 2014     Tdap (Adult) Unspecified Formulation 2003         Gynecologic History  No LMP recorded. Patient is postmenopausal.  Contraception: post menopausal status  Last Pap: . Results were: normal  Last mammogram: . Results were: normal      OB History    Para Term  AB Living   1 1 1 0 0 0   SAB IAB Ectopic Multiple Live Births   0 0 0 0 0      # Outcome Date GA Lbr Steven/2nd Weight Sex Delivery Anes PTL Lv   1 Term                No current outpatient medications on file.     Past Medical History:   Diagnosis Date     " "Diverticulitis      Past Surgical History:   Procedure Laterality Date     HC DILATION/CURETTAGE DIAG/THER NON OB      Description: Dilation And Curettage;  Recorded: 09/02/2009;  Comments: Times 2     Patient has no known allergies.  Family History   Problem Relation Age of Onset     Heart Disease Father      Heart Disease Brother      Social History     Socioeconomic History     Marital status:      Spouse name: Not on file     Number of children: Not on file     Years of education: Not on file     Highest education level: Not on file   Occupational History     Not on file   Tobacco Use     Smoking status: Never     Smokeless tobacco: Never   Substance and Sexual Activity     Alcohol use: No     Drug use: Not on file     Sexual activity: Yes     Birth control/protection: Post-menopausal   Other Topics Concern     Not on file   Social History Narrative     Not on file     Social Determinants of Health     Financial Resource Strain: Not on file   Food Insecurity: Not on file   Transportation Needs: Not on file   Physical Activity: Not on file   Stress: Not on file   Social Connections: Not on file   Intimate Partner Violence: Not on file   Housing Stability: Not on file       Review of Systems  12 point review of systems was completed and found to be negative except for what is been stated above.      Objective:      Vitals:    03/21/23 0849 03/21/23 0931   BP: (!) 146/82 130/82   Pulse: 56    Resp: 12    Temp: 97.8  F (36.6  C)    TempSrc: Tympanic    SpO2: 100%    Weight: 61 kg (134 lb 6 oz)    Height: 1.499 m (4' 11\")          Physical Exam:  General Appearance: Alert, cooperative, no distress, appears stated age   Head: Normocephalic, without obvious abnormality, atraumatic  Eyes: PERRL, conjunctiva/corneas clear, EOM's intact   Ears: Normal TM's and external ear canals, both ears  Neck: Supple, symmetrical, trachea midline, no adenopathy;  thyroid: not enlarged, symmetric, no " tenderness/mass/nodules  Back: Symmetric, no curvature, ROM normal,  Lungs: Clear to auscultation bilaterally, respirations unlabored  Breasts: No breast masses, tenderness, asymmetry, or nipple discharge.  Heart: Regular rate and rhythm, S1 and S2 normal, no murmur, rub, or gallop  Abdomen: Soft, non-tender, bowel sounds active all four quadrants,  no masses, no organomegaly  Pelvic:normal external female genitalia, normal appearing vaginal mucosa and cervix  Extremities: Extremities normal, atraumatic, no cyanosis or edema  Skin: Skin color, texture, turgor normal, no rashes or lesions  Lymph nodes: Cervical, supraclavicular, and axillary nodes normal and   Neurologic: Normal

## 2023-03-22 LAB — DEPRECATED CALCIDIOL+CALCIFEROL SERPL-MC: 26 UG/L (ref 20–75)

## 2023-03-24 LAB
BKR LAB AP GYN ADEQUACY: NORMAL
BKR LAB AP GYN INTERPRETATION: NORMAL
BKR LAB AP HPV REFLEX: NORMAL
BKR LAB AP PREVIOUS ABNORMAL: NORMAL
PATH REPORT.COMMENTS IMP SPEC: NORMAL
PATH REPORT.COMMENTS IMP SPEC: NORMAL
PATH REPORT.RELEVANT HX SPEC: NORMAL

## 2023-03-28 LAB
HUMAN PAPILLOMA VIRUS 16 DNA: NEGATIVE
HUMAN PAPILLOMA VIRUS 18 DNA: NEGATIVE
HUMAN PAPILLOMA VIRUS FINAL DIAGNOSIS: NORMAL
HUMAN PAPILLOMA VIRUS OTHER HR: NEGATIVE

## 2023-04-03 ENCOUNTER — HOSPITAL ENCOUNTER (OUTPATIENT)
Dept: MAMMOGRAPHY | Facility: CLINIC | Age: 63
Discharge: HOME | End: 2023-04-03
Attending: FAMILY MEDICINE
Payer: COMMERCIAL

## 2023-04-03 ENCOUNTER — HOSPITAL ENCOUNTER (OUTPATIENT)
Dept: CT IMAGING | Facility: CLINIC | Age: 63
Discharge: HOME | End: 2023-04-03
Attending: FAMILY MEDICINE
Payer: COMMERCIAL

## 2023-04-03 DIAGNOSIS — Z12.31 VISIT FOR SCREENING MAMMOGRAM: ICD-10-CM

## 2023-04-03 DIAGNOSIS — Z82.49 FAMILY HISTORY OF ISCHEMIC HEART DISEASE: ICD-10-CM

## 2023-04-03 PROCEDURE — 77067 SCR MAMMO BI INCL CAD: CPT

## 2023-04-03 PROCEDURE — 75571 CT HRT W/O DYE W/CA TEST: CPT | Mod: 26 | Performed by: INTERNAL MEDICINE

## 2023-04-03 PROCEDURE — 75571 CT HRT W/O DYE W/CA TEST: CPT | Mod: GA

## 2023-04-04 LAB — NONINV COLON CA DNA+OCC BLD SCRN STL QL: NEGATIVE

## 2023-05-26 ENCOUNTER — ALLIED HEALTH/NURSE VISIT (OUTPATIENT)
Dept: FAMILY MEDICINE | Facility: CLINIC | Age: 63
End: 2023-05-26
Payer: COMMERCIAL

## 2023-05-26 VITALS — DIASTOLIC BLOOD PRESSURE: 92 MMHG | OXYGEN SATURATION: 99 % | HEART RATE: 52 BPM | SYSTOLIC BLOOD PRESSURE: 142 MMHG

## 2023-05-26 DIAGNOSIS — Z01.30 BP CHECK: Primary | ICD-10-CM

## 2023-05-26 PROCEDURE — 99207 PR NO CHARGE NURSE ONLY: CPT

## 2023-05-26 NOTE — PROGRESS NOTES
SUBJECTIVE:  Bonnie Castaneda is a 63 year old female who presents for a follow up evaluation of her hypertension.    The reason for the visit is:  an elevated blood pressure was noted elsewhere and they were told to come for a recheck    Patient is not taking medication    Patient is not monitoring Blood Pressure at home.     Current complaints: none      No current outpatient medications on file.     No current facility-administered medications for this visit.       No Known Allergies      OBJECTIVE:  Please get a blood pressure AND a pulse.  A height is also needed if has not been done in the past year.    BP (!) 154/92   Pulse 52   SpO2 99%     Vitals as recorded, a regular cuff was used.    ASSESSMENT:    Is the HYPERTENSION goal on the problem list? No  Patient Active Problem List   Diagnosis   (none) - all problems resolved or deleted       Plan:      The patient s blood pressure is higher than goal but is less than 180 systolically AND less that 110 diastolically. The patient will be discharged home. Patient advised to schedule appointment with PCP to address.

## 2024-04-01 ENCOUNTER — OFFICE VISIT (OUTPATIENT)
Dept: FAMILY MEDICINE | Facility: CLINIC | Age: 64
End: 2024-04-01
Payer: COMMERCIAL

## 2024-04-01 VITALS
TEMPERATURE: 99 F | HEART RATE: 81 BPM | OXYGEN SATURATION: 98 % | SYSTOLIC BLOOD PRESSURE: 128 MMHG | DIASTOLIC BLOOD PRESSURE: 70 MMHG

## 2024-04-01 DIAGNOSIS — J01.90 ACUTE SINUSITIS WITH SYMPTOMS > 10 DAYS: Primary | ICD-10-CM

## 2024-04-01 PROCEDURE — 99213 OFFICE O/P EST LOW 20 MIN: CPT | Performed by: PHYSICIAN ASSISTANT

## 2024-04-01 NOTE — PROGRESS NOTES
Assessment & Plan       ICD-10-CM    1. Acute sinusitis with symptoms > 10 days  J01.90 amoxicillin-clavulanate (AUGMENTIN) 875-125 MG tablet        Persistent symptoms - no significant sinus pressure but seems to have a lot of drainage which is triggering cough,. Lungs clear so low suspicion for pneumonia but given duration of symptoms (>3 weeks) without significant improvement will treat          Subjective   Bonnie is a 63 year old, presenting for the following health issues:  Cough        4/1/2024     3:25 PM   Additional Questions   Roomed by CHAYO Rosas     \[facility]\""       ED/ Followup:    Facility:  [facility]  Date of visit: 3/24/2024  Reason for visit: Cough  Current Status: She still has a lingering cough.      Symptoms had already been present for 2+ weeks when she went to    went as well and he ended up getting diagnosed with pneumonia  She was told it was post nasal drip  Still not feeling better although maybe not as bad as when symptoms started  Can't sleep at night as when she lays down she will start coughing  Sore throat is better  No sinus pressure but feels 'congestion' in her upper chest  Low grade temps of 99          Review of Systems  Remainder of ROS obtained and found to be negative other than that which was documented above        Objective    /70   Pulse 81   Temp 99  F (37.2  C) (Tympanic)   SpO2 98%   There is no height or weight on file to calculate BMI.  Physical Exam   GENERAL: alert and no distress  EYES: Eyes grossly normal to inspection  HENT: ear canals and TM's normal, nose and mouth without ulcers or lesions  NECK: no adenopathy  RESP: lungs clear to auscultation - no rales, rhonchi or wheezes  CV: regular rates and rhythm and no peripheral edema    Diagnostic Tests: none        Signed Electronically by: Meg Santos PA-C

## 2024-06-22 ENCOUNTER — HEALTH MAINTENANCE LETTER (OUTPATIENT)
Age: 64
End: 2024-06-22

## 2024-07-01 ENCOUNTER — OFFICE VISIT (OUTPATIENT)
Dept: FAMILY MEDICINE | Facility: CLINIC | Age: 64
End: 2024-07-01
Payer: COMMERCIAL

## 2024-07-01 ENCOUNTER — NURSE TRIAGE (OUTPATIENT)
Dept: FAMILY MEDICINE | Facility: CLINIC | Age: 64
End: 2024-07-01

## 2024-07-01 VITALS
DIASTOLIC BLOOD PRESSURE: 78 MMHG | BODY MASS INDEX: 26.84 KG/M2 | SYSTOLIC BLOOD PRESSURE: 142 MMHG | HEIGHT: 59 IN | RESPIRATION RATE: 16 BRPM | HEART RATE: 68 BPM | WEIGHT: 133.13 LBS | TEMPERATURE: 98.1 F | OXYGEN SATURATION: 97 %

## 2024-07-01 DIAGNOSIS — J01.90 ACUTE SINUSITIS WITH SYMPTOMS > 10 DAYS: Primary | ICD-10-CM

## 2024-07-01 DIAGNOSIS — R73.9 ELEVATED BLOOD SUGAR: ICD-10-CM

## 2024-07-01 DIAGNOSIS — R05.1 ACUTE COUGH: ICD-10-CM

## 2024-07-01 PROCEDURE — 99214 OFFICE O/P EST MOD 30 MIN: CPT | Performed by: FAMILY MEDICINE

## 2024-07-01 PROCEDURE — G2211 COMPLEX E/M VISIT ADD ON: HCPCS | Performed by: FAMILY MEDICINE

## 2024-07-01 RX ORDER — DOXYCYCLINE 100 MG/1
100 CAPSULE ORAL 2 TIMES DAILY
Qty: 14 CAPSULE | Refills: 0 | Status: SHIPPED | OUTPATIENT
Start: 2024-07-01

## 2024-07-01 RX ORDER — BENZONATATE 100 MG/1
100 CAPSULE ORAL 3 TIMES DAILY PRN
Qty: 30 CAPSULE | Refills: 0 | Status: SHIPPED | OUTPATIENT
Start: 2024-07-01

## 2024-07-01 NOTE — TELEPHONE ENCOUNTER
S-(situation): cough    B-(background): cough for 2 weeks. Pt states went to a [facility] urgent care on 6/24, negative for strep and covid then.     A-(assessment): productive cough with light green phlegm.   New wheezing 7/1.  Denies fevers and SOB.     R-(recommendations): Go to office now. No appointments open at [facility]. Pt wondering if message can be sent to PCP to double book, otherwise pt ok to go to [facility].     Reason for Disposition   Wheezing is present    Additional Information   Negative: Bluish (or gray) lips or face   Negative: SEVERE difficulty breathing (e.g., struggling for each breath, speaks in single words)   Negative: Rapid onset of cough and has hives   Negative: Coughing started suddenly after medicine, an allergic food or bee sting   Negative: Difficulty breathing after exposure to flames, smoke, or fumes   Negative: Sounds like a life-threatening emergency to the triager   Negative: Previous asthma attacks and this feels like asthma attack   Negative: Dry cough (non-productive; no sputum or minimal clear sputum) and within 14 days of COVID-19 Exposure   Negative: MODERATE difficulty breathing (e.g., speaks in phrases, SOB even at rest, pulse 100-120) and still present when not coughing   Negative: Chest pain present when not coughing   Negative: Passed out (i.e., fainted, collapsed and was not responding)   Negative: Patient sounds very sick or weak to the triager   Negative: MILD difficulty breathing (e.g., minimal/no SOB at rest, SOB with walking, pulse <100) and still present when not coughing   Negative: Coughed up > 1 tablespoon (15 ml) blood (Exception: Blood-tinged sputum.)   Negative: Fever > 103 F (39.4 C)   Negative: Fever > 101 F (38.3 C) and over 60 years of age   Negative: Fever > 100.0 F (37.8 C) and has diabetes mellitus or a weak immune system (e.g., HIV positive, cancer chemotherapy, organ transplant, splenectomy, chronic steroids)   Negative: Fever > 100.0 F (37.8 C) and  bedridden (e.g., CVA, chronic illness, recovering from surgery)   Negative: Increasing ankle swelling    Protocols used: Cough-A-OH

## 2024-07-01 NOTE — TELEPHONE ENCOUNTER
Provider Response to 2nd Level Triage Request    I have reviewed the RN documentation. My recommendation is:  Face To Face Visit. Same Day: work patient in on my schedule as an overbook. 220 arrival - please let her know that unfortunately we do not have Xray today here in clinic so we will be a bit limited.    Have her wear a mask please    EB

## 2024-07-01 NOTE — TELEPHONE ENCOUNTER
Noted. RN called patient. She will arrive at 2:20. Relayed message about X-ray. She will wear a mask.   Zoe Rivera RN on 7/1/2024 at 10:23 AM

## 2024-07-01 NOTE — PROGRESS NOTES
Assessment/Plan:    Bonnie Castaneda is a 64 year old female presenting for:    Acute sinusitis with symptoms > 10 days  Given that she has had sinus symptoms for over 10 days we will treat her with doxycycline for acute sinusitis.  Hopefully this will help with her coughing as well.  I do not suspect pneumonia but I do not have x-ray here today.  The doxycycline should double cover.  - doxycycline hyclate (VIBRAMYCIN) 100 MG capsule  Dispense: 14 capsule; Refill: 0  - benzonatate (TESSALON) 100 MG capsule  Dispense: 30 capsule; Refill: 0    Acute cough  - doxycycline hyclate (VIBRAMYCIN) 100 MG capsule  Dispense: 14 capsule; Refill: 0  - benzonatate (TESSALON) 100 MG capsule  Dispense: 30 capsule; Refill: 0    Elevated blood sugar level  Patient is overdue for complete physical.  Encouraged her to follow-up sometime in the next month.  Did offer to help schedule her an appointment.  She will contact us and let us know.    There are no discontinued medications.    I personally spent over 35 minutes performing care related to this patient on the day of the patient visit.  This includes chart review, precharting, talking with/ examining the patient as well as completing after visit documentation.      Chief Complaint:  Follow Up        Subjective:   Bonnie Castaneda is a pleasant 64-year-old female who presents to the clinic today with concerns over 2 and half weeks of cough and sinus pressure.  Patient notes that about 2 and half weeks ago she began to feel a bit ill.  She was seen at urgent care and told that she likely has a viral infection.  This was about a week and a half ago.    Reassurance was given at that time.  She states that her coughing has become a bit worse.  She does have some mild sputum production.    She called this morning because she noted that she was having a bit of wheezing.  This cleared fairly quickly when she ate and drink something.  She has not really felt shortness of breath.  She does not  "have a history of asthma.    12 point review of systems completed and negative except for what has been described above    History   Smoking Status    Never   Smokeless Tobacco    Never         Current Outpatient Medications:     benzonatate (TESSALON) 100 MG capsule, Take 1 capsule (100 mg) by mouth 3 times daily as needed for cough, Disp: 30 capsule, Rfl: 0    doxycycline hyclate (VIBRAMYCIN) 100 MG capsule, Take 1 capsule (100 mg) by mouth 2 times daily, Disp: 14 capsule, Rfl: 0      Objective:  Vitals:    07/01/24 1405   BP: (!) 142/78   Pulse: 68   Resp: 16   Temp: 98.1  F (36.7  C)   TempSrc: Tympanic   SpO2: 97%   Weight: 60.4 kg (133 lb 2 oz)   Height: 1.499 m (4' 11\")       Body mass index is 26.89 kg/m .    Vital signs reviewed and stable  General: No acute distress  Psych: Appropriate affect  HEENT: moist mucous membranes, pupils equal, round, reactive to light and accomodation, tympanic membranes are pearly grey bilaterally  Lymph: no cervical or supraclavicular lymphadenopathy  Cardiovascular: regular rate and rhythm with no murmur  Pulmonary: clear to auscultation bilaterally with no wheeze  Abdomen: soft, non tender, non distended with normo-active bowel sounds  Extremities: warm and well perfused with no edema  Skin: warm and dry with no rash         This note has been dictated and transcribed using voice recognition software.   Any errors in transcription are unintentional and inherent to the software.  Answers submitted by the patient for this visit:  General Questionnaire (Submitted on 7/1/2024)  Chief Complaint: Chronic problems general questions HPI Form  How many servings of fruits and vegetables do you eat daily?: 2-3  On average, how many sweetened beverages do you drink each day (Examples: soda, juice, sweet tea, etc.  Do NOT count diet or artificially sweetened beverages)?: 3  How many minutes a day do you exercise enough to make your heart beat faster?: 30 to 60  How many days a week do " you exercise enough to make your heart beat faster?: 6  How many days per week do you miss taking your medication?: 0  General Concern (Submitted on 7/1/2024)  Chief Complaint: Chronic problems general questions HPI Form  What is the reason for your visit today?: coughing and flame wheezing  When did your symptoms begin?: 1-2 weeks ago  How would you describe these symptoms?: Severe  Are your symptoms:: Worsening  Have you had these symptoms before?: Yes

## 2024-12-30 PROBLEM — E11.9 TYPE 2 DIABETES MELLITUS WITHOUT COMPLICATION, WITHOUT LONG-TERM CURRENT USE OF INSULIN (H): Status: ACTIVE | Noted: 2024-06-24

## 2025-01-04 ENCOUNTER — HEALTH MAINTENANCE LETTER (OUTPATIENT)
Age: 65
End: 2025-01-04

## 2025-01-10 ENCOUNTER — OFFICE VISIT (OUTPATIENT)
Dept: FAMILY MEDICINE | Facility: CLINIC | Age: 65
End: 2025-01-10
Payer: COMMERCIAL

## 2025-01-10 VITALS
WEIGHT: 132.38 LBS | HEART RATE: 59 BPM | DIASTOLIC BLOOD PRESSURE: 78 MMHG | BODY MASS INDEX: 26.69 KG/M2 | RESPIRATION RATE: 12 BRPM | TEMPERATURE: 97.8 F | SYSTOLIC BLOOD PRESSURE: 130 MMHG | HEIGHT: 59 IN | OXYGEN SATURATION: 98 %

## 2025-01-10 DIAGNOSIS — Z00.00 ROUTINE GENERAL MEDICAL EXAMINATION AT A HEALTH CARE FACILITY: Primary | ICD-10-CM

## 2025-01-10 DIAGNOSIS — R73.9 ELEVATED BLOOD SUGAR: ICD-10-CM

## 2025-01-10 DIAGNOSIS — E11.9 TYPE 2 DIABETES MELLITUS WITHOUT COMPLICATION, WITHOUT LONG-TERM CURRENT USE OF INSULIN (H): ICD-10-CM

## 2025-01-10 DIAGNOSIS — L65.9 HAIR LOSS: ICD-10-CM

## 2025-01-10 DIAGNOSIS — L91.8 SKIN TAG: ICD-10-CM

## 2025-01-10 LAB
ANION GAP SERPL CALCULATED.3IONS-SCNC: 12 MMOL/L (ref 7–15)
BUN SERPL-MCNC: 18.9 MG/DL (ref 8–23)
CALCIUM SERPL-MCNC: 9.4 MG/DL (ref 8.8–10.4)
CHLORIDE SERPL-SCNC: 104 MMOL/L (ref 98–107)
CHOLEST SERPL-MCNC: 217 MG/DL
CREAT SERPL-MCNC: 0.83 MG/DL (ref 0.51–0.95)
CREAT UR-MCNC: 114.4 MG/DL
EGFRCR SERPLBLD CKD-EPI 2021: 79 ML/MIN/1.73M2
ERYTHROCYTE [DISTWIDTH] IN BLOOD BY AUTOMATED COUNT: 13.6 % (ref 10–15)
EST. AVERAGE GLUCOSE BLD GHB EST-MCNC: 148 MG/DL
GLUCOSE SERPL-MCNC: 123 MG/DL (ref 70–99)
HBA1C MFR BLD: 6.8 % (ref 0–5.6)
HCO3 SERPL-SCNC: 25 MMOL/L (ref 22–29)
HCT VFR BLD AUTO: 40.2 % (ref 35–47)
HDLC SERPL-MCNC: 47 MG/DL
HGB BLD-MCNC: 13.6 G/DL (ref 11.7–15.7)
HOLD SPECIMEN: NORMAL
HOLD SPECIMEN: NORMAL
LDLC SERPL CALC-MCNC: 145 MG/DL
MCH RBC QN AUTO: 31.9 PG (ref 26.5–33)
MCHC RBC AUTO-ENTMCNC: 33.8 G/DL (ref 31.5–36.5)
MCV RBC AUTO: 94 FL (ref 78–100)
MICROALBUMIN UR-MCNC: <12 MG/L
MICROALBUMIN/CREAT UR: NORMAL MG/G{CREAT}
NONHDLC SERPL-MCNC: 170 MG/DL
PLATELET # BLD AUTO: 309 10E3/UL (ref 150–450)
POTASSIUM SERPL-SCNC: 4.2 MMOL/L (ref 3.4–5.3)
RBC # BLD AUTO: 4.26 10E6/UL (ref 3.8–5.2)
SODIUM SERPL-SCNC: 141 MMOL/L (ref 135–145)
TRIGL SERPL-MCNC: 125 MG/DL
TSH SERPL DL<=0.005 MIU/L-ACNC: 3.57 UIU/ML (ref 0.3–4.2)
WBC # BLD AUTO: 6.7 10E3/UL (ref 4–11)

## 2025-01-10 PROCEDURE — 80061 LIPID PANEL: CPT | Performed by: FAMILY MEDICINE

## 2025-01-10 PROCEDURE — 36415 COLL VENOUS BLD VENIPUNCTURE: CPT | Performed by: FAMILY MEDICINE

## 2025-01-10 PROCEDURE — 82043 UR ALBUMIN QUANTITATIVE: CPT | Performed by: FAMILY MEDICINE

## 2025-01-10 PROCEDURE — 80048 BASIC METABOLIC PNL TOTAL CA: CPT | Performed by: FAMILY MEDICINE

## 2025-01-10 PROCEDURE — 85027 COMPLETE CBC AUTOMATED: CPT | Performed by: FAMILY MEDICINE

## 2025-01-10 PROCEDURE — 84443 ASSAY THYROID STIM HORMONE: CPT | Performed by: FAMILY MEDICINE

## 2025-01-10 PROCEDURE — 83036 HEMOGLOBIN GLYCOSYLATED A1C: CPT | Performed by: FAMILY MEDICINE

## 2025-01-10 PROCEDURE — 82570 ASSAY OF URINE CREATININE: CPT | Performed by: FAMILY MEDICINE

## 2025-01-10 SDOH — HEALTH STABILITY: PHYSICAL HEALTH: ON AVERAGE, HOW MANY DAYS PER WEEK DO YOU ENGAGE IN MODERATE TO STRENUOUS EXERCISE (LIKE A BRISK WALK)?: 7 DAYS

## 2025-01-10 SDOH — HEALTH STABILITY: PHYSICAL HEALTH: ON AVERAGE, HOW MANY MINUTES DO YOU ENGAGE IN EXERCISE AT THIS LEVEL?: 30 MIN

## 2025-01-10 ASSESSMENT — SOCIAL DETERMINANTS OF HEALTH (SDOH): HOW OFTEN DO YOU GET TOGETHER WITH FRIENDS OR RELATIVES?: TWICE A WEEK

## 2025-01-10 NOTE — PATIENT INSTRUCTIONS
"It was nice to see you today.    We covered several topics today.  Here is a quick summary:    1.  Skin tags: I placed a referral to dermatology.  They will contact you to help you schedule for a skin check.  You can mention the skin tags and hopefully they will be able to take care of them if appropriate.    2.  Hair loss: Will check a thyroid level today.  Could be normal hormonal fluctuations as well.    3.  Diabetes: You have been diagnosed with type 2 diabetes today.  You had been prediabetic on the last several diabetic screenings but today your A1c (average of 3-month blood sugars) was in the diabetic range.    Please see the diabetic educator.  Referral was placed and they will call you to schedule an appointment.  They will be able to help you with some dietary guidelines.  We did discuss starting to check blood sugars today and if you change your mind about me sending a blood sugar meter to the pharmacy I am happy to do so.  You would then be able to poke your finger to check your blood sugars.    We discussed cutting back on the \"sweet tea\" and decreasing portion sizes of carbohydrate foods such as potatoes and rice.    I would like you to speak with the diabetic educator and follow-up with me via video visit in 2 to 3 months to discuss your progress.    At that appointment I would like to discuss some of the \"standard of care\" diabetic treatment policies (see below) that we did not have time to discuss today:    1. Daily low dose aspirin  2. Statin cholesterol medication to prevent heart attack and stroke  3. Specific blood pressure medication called lisinopril (or medication in a similar class) to protect kidneys  4. A1C less than 8.0  5. Yearly eye exam  6. Yearly foot exam  7. Visit with me every 3 months until controlled and then every 6 months  8. Yearly urine exam (urine microalbumin) to check kidney function      If you would like to do a video visit sooner than that I would be more than happy to " meet with you to discuss further as well.    You got your COVID and pneumonia vaccination today.  You are still eligible for your Tdap (tetanus), influenza, RSV and shingles vaccinations.  I would be happy to discuss this further with you at a video appointment as well!    Patient Education   Preventive Care Advice   This is general advice given by our system to help you stay healthy. However, your care team may have specific advice just for you. Please talk to your care team about your preventive care needs.  Nutrition  Eat 5 or more servings of fruits and vegetables each day.  Try wheat bread, brown rice and whole grain pasta (instead of white bread, rice, and pasta).  Get enough calcium and vitamin D. Check the label on foods and aim for 100% of the RDA (recommended daily allowance).  Lifestyle  Exercise at least 150 minutes each week  (30 minutes a day, 5 days a week).  Do muscle strengthening activities 2 days a week. These help control your weight and prevent disease.  No smoking.  Wear sunscreen to prevent skin cancer.  Have a dental exam and cleaning every 6 months.  Yearly exams  See your health care team every year to talk about:  Any changes in your health.  Any medicines your care team has prescribed.  Preventive care, family planning, and ways to prevent chronic diseases.  Shots (vaccines)   HPV shots (up to age 26), if you've never had them before.  Hepatitis B shots (up to age 59), if you've never had them before.  COVID-19 shot: Get this shot when it's due.  Flu shot: Get a flu shot every year.  Tetanus shot: Get a tetanus shot every 10 years.  Pneumococcal, hepatitis A, and RSV shots: Ask your care team if you need these based on your risk.  Shingles shot (for age 50 and up)  General health tests  Diabetes screening:  Starting at age 35, Get screened for diabetes at least every 3 years.  If you are younger than age 35, ask your care team if you should be screened for diabetes.  Cholesterol test: At  age 39, start having a cholesterol test every 5 years, or more often if advised.  Bone density scan (DEXA): At age 50, ask your care team if you should have this scan for osteoporosis (brittle bones).  Hepatitis C: Get tested at least once in your life.  STIs (sexually transmitted infections)  Before age 24: Ask your care team if you should be screened for STIs.  After age 24: Get screened for STIs if you're at risk. You are at risk for STIs (including HIV) if:  You are sexually active with more than one person.  You don't use condoms every time.  You or a partner was diagnosed with a sexually transmitted infection.  If you are at risk for HIV, ask about PrEP medicine to prevent HIV.  Get tested for HIV at least once in your life, whether you are at risk for HIV or not.  Cancer screening tests  Cervical cancer screening: If you have a cervix, begin getting regular cervical cancer screening tests starting at age 21.  Breast cancer scan (mammogram): If you've ever had breasts, begin having regular mammograms starting at age 40. This is a scan to check for breast cancer.  Colon cancer screening: It is important to start screening for colon cancer at age 45.  Have a colonoscopy test every 10 years (or more often if you're at risk) Or, ask your provider about stool tests like a FIT test every year or Cologuard test every 3 years.  To learn more about your testing options, visit:   .  For help making a decision, visit:   https://bit.ly/wz34583.  Prostate cancer screening test: If you have a prostate, ask your care team if a prostate cancer screening test (PSA) at age 55 is right for you.  Lung cancer screening: If you are a current or former smoker ages 50 to 80, ask your care team if ongoing lung cancer screenings are right for you.  For informational purposes only. Not to replace the advice of your health care provider. Copyright   2023 [employer]. All rights reserved. Clinically reviewed by the [facility]  [place] [facility]. [employer] 200057 - REV 01/24.

## 2025-01-10 NOTE — PROGRESS NOTES
"Preventive Care Visit  [facility] PIPO Barclay MD, Family Medicine  Roland 10, 2025  {Provider  Link to [facility] :[id number]}    {PROVIDER CHARTING PREFERENCE:936774}    Raul Nicole is a 64 year old, presenting for the following:  Physical (Fasting for labs today- would like her thyroid checked ) and Derm Problem (Skin tag on her scalp and has been getting more \"spots\" on her skin)         HPI  ***  {MA/LPN/RN Pre-Provider Visit Orders- hCG/UA/Strep (Optional):888451}  {SUPERLIST (Optional):954444}  {additonal problems for provider to add (Optional):750683}  Health Care Directive  Patient does not have a Health Care Directive: {ADVANCE_DIRECTIVE_STATUS:859460}      1/10/2025   General Health   How would you rate your overall physical health? Good   Feel stress (tense, anxious, or unable to sleep) Not at all         1/10/2025   Nutrition   Three or more servings of calcium each day? Yes   Diet: Regular (no restrictions)   How many servings of fruit and vegetables per day? 4 or more   How many sweetened beverages each day? (!) 3         1/10/2025   Exercise   Days per week of moderate/strenous exercise 7 days   Average minutes spent exercising at this level 30 min         1/10/2025   Social Factors   Frequency of gathering with friends or relatives Twice a week   Worry food won't last until get money to buy more No   Food not last or not have enough money for food? No   Do you have housing? (Housing is defined as stable permanent housing and does not include staying ouside in a car, in a tent, in an abandoned building, in an overnight shelter, or couch-surfing.) Yes   Are you worried about losing your housing? No   Lack of transportation? No   Unable to get utilities (heat,electricity)? No         1/10/2025   Fall Risk   Fallen 2 or more times in the past year? No   Trouble with walking or balance? No          1/10/2025   Dental   Dentist two times every year? (!) NO         1/10/2025 "   TB Screening   Were you born outside of the [place]? No         Today's PHQ-2 Score:       1/10/2025     7:35 AM   PHQ-2 ( 1999 [employer])   Q1: Little interest or pleasure in doing things 0   Q2: Feeling down, depressed or hopeless 0   PHQ-2 Score 0    Q1: Little interest or pleasure in doing things Not at all   Q2: Feeling down, depressed or hopeless Not at all   PHQ-2 Score 0       Patient-reported           1/10/2025   Substance Use   Alcohol more than 3/day or more than 7/wk No   Do you use any other substances recreationally? No     Social History     Tobacco Use    Smoking status: Never    Smokeless tobacco: Never   Substance Use Topics    Alcohol use: No     {Provider  If there are gaps in the social history shown above, please follow the link to update and then refresh the note Link to Social and Substance History :544540}      4/3/2023   LAST FHS-7 RESULTS   1st degree relative breast or ovarian cancer No   Any relative bilateral breast cancer No   Any male have breast cancer No   Any ONE woman have BOTH breast AND ovarian cancer No   Any woman with breast cancer before 50yrs No   2 or more relatives with breast AND/OR ovarian cancer No   2 or more relatives with breast AND/OR bowel cancer No     {If any of the questions to the FHS7 are answered yes, consider referral for genetic counseling.    Additional indications for genetic referral include personal history of breast or ovarian cancer, genetic mutation in 1st degree relative which increases risk of breast cancer including BRCA1, BRCA2, LEANDRO, PALB 2, TP53, CHEK2, PTEN, CDH1, STK11 (per ACS) and/or 1st degree relative with history of pancreatic or high-risk prostate cancer (per NCCN):137308}   {Mammogram Decision Support (Optional):118322}        1/10/2025   STI Screening   New sexual partner(s) since last STI/HIV test? No     History of abnormal Pap smear: { :638003}        Latest Ref Rng & Units 3/21/2023     8:53 AM   PAP / HPV   PAP  Negative for  "Intraepithelial Lesion or Malignancy (NILM)    HPV 16 DNA Negative Negative    HPV 18 DNA Negative Negative    Other HR HPV Negative Negative      ASCVD Risk   The 10-year ASCVD risk score (Dahlia MACHADO, et al., 2019) is: 8.5%    Values used to calculate the score:      Age: 64 years      Sex: Female      Is Non- : No      Diabetic: Yes      Tobacco smoker: No      Systolic Blood Pressure: 130 mmHg      Is BP treated: No      HDL Cholesterol: 59 mg/dL      Total Cholesterol: 237 mg/dL    {Link to Fracture Risk Assessment Tool (Optional):186163}    {Provider  REQUIRED FOR AWV Use the storyboard to review patient history, after sections have been marked as reviewed, refresh note to capture documentation:397704}   Reviewed and updated as needed this visit by Provider                    {HISTORY OPTIONS (Optional):979100}    {ROS Picklists (Optional):444923}     Objective    Exam  /78   Pulse 59   Temp 97.8  F (36.6  C) (Tympanic)   Resp 12   Ht 1.499 m (4' 11\")   Wt 60 kg (132 lb 6 oz)   SpO2 98%   BMI 26.74 kg/m     Estimated body mass index is 26.74 kg/m  as calculated from the following:    Height as of this encounter: 1.499 m (4' 11\").    Weight as of this encounter: 60 kg (132 lb 6 oz).    Physical Exam  {Exam Choices (Optional):188564}        Signed Electronically by: Laly Barclay MD  {Email feedback regarding this note to [email or web address]   :052311}  "

## 2025-01-16 ENCOUNTER — TELEPHONE (OUTPATIENT)
Dept: FAMILY MEDICINE | Facility: CLINIC | Age: 65
End: 2025-01-16

## 2025-04-19 ENCOUNTER — HEALTH MAINTENANCE LETTER (OUTPATIENT)
Age: 65
End: 2025-04-19

## 2025-06-21 ENCOUNTER — HEALTH MAINTENANCE LETTER (OUTPATIENT)
Age: 65
End: 2025-06-21

## 2025-08-02 ENCOUNTER — HEALTH MAINTENANCE LETTER (OUTPATIENT)
Age: 65
End: 2025-08-02